# Patient Record
Sex: FEMALE | Race: WHITE | NOT HISPANIC OR LATINO | Employment: FULL TIME | URBAN - METROPOLITAN AREA
[De-identification: names, ages, dates, MRNs, and addresses within clinical notes are randomized per-mention and may not be internally consistent; named-entity substitution may affect disease eponyms.]

---

## 2019-01-08 ENCOUNTER — TELEPHONE (OUTPATIENT)
Dept: INTERNAL MEDICINE | Facility: CLINIC | Age: 49
End: 2019-01-08

## 2019-01-08 NOTE — TELEPHONE ENCOUNTER
Called and talked with the pt. Pt stated her insurance never covers the Trokendi, and she does not need a PA. Pt stated her pharmacy usually calls. Pt stated she pays cash for this medication. Nothing further was needed.

## 2019-01-10 VITALS — WEIGHT: 246 LBS | HEIGHT: 66 IN | BODY MASS INDEX: 39.53 KG/M2

## 2019-01-10 PROBLEM — E78.00 HYPERCHOLESTEREMIA: Status: ACTIVE | Noted: 2019-01-10

## 2019-01-10 PROBLEM — E66.9 OBESITY, CLASS II, BMI 35-39.9: Status: ACTIVE | Noted: 2019-01-10

## 2019-01-10 PROBLEM — F50.81 BINGE EATING DISORDER: Status: ACTIVE | Noted: 2019-01-10

## 2019-01-10 PROBLEM — F41.9 ANXIETY: Status: ACTIVE | Noted: 2019-01-10

## 2019-01-10 RX ORDER — LIRAGLUTIDE 6 MG/ML
INJECTION SUBCUTANEOUS
Refills: 2 | COMMUNITY
Start: 2018-11-18 | End: 2019-01-17 | Stop reason: SDUPTHER

## 2019-01-10 RX ORDER — PHENTERMINE HYDROCHLORIDE 37.5 MG/1
TABLET ORAL DAILY
Refills: 0 | COMMUNITY
Start: 2018-12-19 | End: 2019-01-17 | Stop reason: SDUPTHER

## 2019-01-10 RX ORDER — PHENDIMETRAZINE TARTRATE 35 MG/1
TABLET ORAL DAILY
Refills: 0 | COMMUNITY
Start: 2018-12-19 | End: 2019-01-17 | Stop reason: SDUPTHER

## 2019-01-10 RX ORDER — LURASIDONE HYDROCHLORIDE 60 MG/1
60 TABLET, FILM COATED ORAL DAILY
Refills: 0 | COMMUNITY
Start: 2018-12-16 | End: 2020-10-04

## 2019-01-10 RX ORDER — TOPIRAMATE 50 MG/1
1 CAPSULE, EXTENDED RELEASE ORAL DAILY
COMMUNITY
End: 2020-10-04

## 2019-01-17 ENCOUNTER — OFFICE VISIT (OUTPATIENT)
Dept: BARIATRICS/WEIGHT MGMT | Facility: CLINIC | Age: 49
End: 2019-01-17

## 2019-01-17 VITALS
WEIGHT: 243 LBS | BODY MASS INDEX: 39.05 KG/M2 | SYSTOLIC BLOOD PRESSURE: 132 MMHG | DIASTOLIC BLOOD PRESSURE: 80 MMHG | HEART RATE: 64 BPM | HEIGHT: 66 IN

## 2019-01-17 DIAGNOSIS — E66.9 OBESITY, CLASS II, BMI 35-39.9: Primary | ICD-10-CM

## 2019-01-17 DIAGNOSIS — E78.5 HYPERLIPIDEMIA, UNSPECIFIED HYPERLIPIDEMIA TYPE: ICD-10-CM

## 2019-01-17 PROCEDURE — MEDWTESTPT: Performed by: NURSE PRACTITIONER

## 2019-01-17 RX ORDER — LAMOTRIGINE 100 MG/1
TABLET ORAL
Refills: 0 | Status: ON HOLD | COMMUNITY
Start: 2019-01-08 | End: 2022-11-30

## 2019-01-17 RX ORDER — VENLAFAXINE HYDROCHLORIDE 150 MG/1
CAPSULE, EXTENDED RELEASE ORAL
Refills: 3 | Status: ON HOLD | COMMUNITY
Start: 2018-12-30 | End: 2022-11-30

## 2019-01-17 RX ORDER — LAMOTRIGINE 150 MG/1
100 TABLET ORAL 2 TIMES DAILY
Refills: 0 | COMMUNITY
Start: 2019-01-08 | End: 2023-01-19

## 2019-01-17 RX ORDER — PHENDIMETRAZINE TARTRATE 35 MG/1
TABLET ORAL
Qty: 28 EACH | Refills: 0 | Status: ON HOLD | OUTPATIENT
Start: 2019-01-17 | End: 2022-11-30

## 2019-01-17 RX ORDER — LIRAGLUTIDE 6 MG/ML
1.8 INJECTION SUBCUTANEOUS DAILY
Qty: 3 PEN | Refills: 2 | OUTPATIENT
Start: 2019-01-17 | End: 2020-10-04

## 2019-01-17 RX ORDER — PHENTERMINE HYDROCHLORIDE 37.5 MG/1
TABLET ORAL
Qty: 28 TABLET | Refills: 0 | OUTPATIENT
Start: 2019-01-17 | End: 2020-10-04

## 2019-01-17 RX ORDER — TRAZODONE HYDROCHLORIDE 50 MG/1
TABLET ORAL
Refills: 0 | COMMUNITY
Start: 2018-11-18 | End: 2020-10-04

## 2019-01-17 NOTE — PROGRESS NOTES
"[facility]   [facility] [address]  [place] [address]    Name: Erika Alfonso  : 1970  Patient Care Team:  Sammy Crowley DO as PCP - General (Family Medicine)    Chief Complaint;:   Chief Complaint   Patient presents with   • Follow-up        HPI     Erika Alfonso is a 48 y.o. female here to follow up in active weight loss. Patient is satisfied with weight loss progress and has no questions or concerns today.. There were no unexpected side effects.. The patient is taking the recommended multivitamin and fish oil. Hunger control has improved. The patient is exercising. The patient is not using a food journal. The BMI is Body mass index is 39.22 kg/m²..     The patient is exercising with a FITT score of:    Frequency Intensity Time Strength Training   []   0 []   0 []   0 []   0   []   1 []   1 []   1 []   1   []   2 []   2 []   2 []   2   []   3  []   4 []   3  []   4 []   3  []   4 []   3  []   4     VS and Measurements  Vitals:    19 0924   BP: 132/80   BP Location: Left arm   Patient Position: Sitting   Cuff Size: Adult   Pulse: 64   Weight: 110 kg (243 lb)   Height: 167.6 cm (66\")     Start Weight/BMI: 274lbs  Last Weight/BMI: 246lbs  Current Weight/BMI: 243  Neck: 13  Chest: 44  Waist: 40  Hips: 51  Thighs: 47    Treatment Objectives   Weight Loss Goal: Maintaining 10% loss of beginning body weight   Non-Weight Loss Goals: Improved cholesterol: has been addressed.    Past Medical History:   Diagnosis Date   • Depression    • PCOS (polycystic ovarian syndrome)        Patient Active Problem List   Diagnosis   • Hypercholesteremia   • Binge eating disorder   • Obesity, Class II, BMI 35-39.9   • Anxiety       No Known Allergies      Current Outpatient Medications:   •  Chromium (CHROMEMATE PO), Take 1 tablet by mouth 2 (Two) Times a Day. Take one white capsule with Breakfast, and one white capsule with dinner., Disp: , Rfl:   •  lamoTRIgine " (LaMICtal) 100 MG tablet, , Disp: , Rfl: 0  •  lamoTRIgine (LaMICtal) 150 MG tablet, , Disp: , Rfl: 0  •  LATUDA 60 MG tablet tablet, Take 60 mg by mouth Daily., Disp: , Rfl: 0  •  Phendimetrazine Tartrate 35 MG tablet, Take 1 tablet at 3 pm., Disp: 28 each, Rfl: 0  •  phentermine (ADIPEX-P) 37.5 MG tablet, Take one tablet at 11 am., Disp: 28 tablet, Rfl: 0  •  Topiramate ER (TROKENDI XR) 50 MG capsule sustained-release 24 hr, Take 1 capsule by mouth Daily., Disp: , Rfl:   •  traZODone (DESYREL) 50 MG tablet, TK 1 T PO HS, Disp: , Rfl: 0  •  venlafaxine XR (EFFEXOR-XR) 150 MG 24 hr capsule, TK 1 C PO D, Disp: , Rfl: 3  •  VICTOZA 18 MG/3ML solution pen-injector injection, Inject 1.8 mg under the skin into the appropriate area as directed Daily., Disp: 3 pen, Rfl: 2  •  Liraglutide (VICTOZA) 18 MG/3ML solution pen-injector injection, Inject 1.2 mg under the skin into the appropriate area as directed Daily., Disp: 3 pen, Rfl: 2    Physical Exam:    General:  .well developed; well nourished  no acute distress  obese    Lungs:  breathing is unlabored  clear to auscultation bilaterally   Heart:  regular rate and rhythm, S1, S2 normal, no murmur, click, rub or gallop       ASSESSMENT/PLAN:   Erika was seen today for follow-up.    Diagnoses and all orders for this visit:    Obesity, Class II, BMI 35-39.9  -     Phendimetrazine Tartrate 35 MG tablet; Take 1 tablet at 3 pm.  -     phentermine (ADIPEX-P) 37.5 MG tablet; Take one tablet at 11 am.  -     VICTOZA 18 MG/3ML solution pen-injector injection; Inject 1.8 mg under the skin into the appropriate area as directed Daily.  -     Liraglutide (VICTOZA) 18 MG/3ML solution pen-injector injection; Inject 1.2 mg under the skin into the appropriate area as directed Daily.   -Increase liraglutide to recommended 3.0mg.    -Hunger control better with 3735.    Hyperlipidemia, unspecified hyperlipidemia type    I have instructed the patient to continue with pursuit of medical weight  loss as a part of this program. The current plan for this month includes: continue current exercise efforts, it is appropriate to continue anorectic medications as prescribed at this time, weight loss goal 4-6lbs this month. and adjust macronutrients as discussed. Bring food journal to next visit for review. Add protein this month, is going to try protein shakes. Goal 100g/day of protein this month. Discussed ketogenic VS adequate protein diet. Keep up excellent food choices. Is tapering off of lamictal with psychiatrist and doing well.     Plan of care reviewed with the patient at the conclusion of today's visit. We discussed the risks, benefits, and limitations of treatments. Continue medications and OTC supplements as discussed. Patient verbalizes understanding of and agreement with management plan.      Return in about 1 month (around 2/17/2019).     CYNTHIA Mcwilliams

## 2019-10-30 ENCOUNTER — TREATMENT (OUTPATIENT)
Dept: PHYSICAL THERAPY | Facility: CLINIC | Age: 49
End: 2019-10-30

## 2019-10-30 DIAGNOSIS — M76.822 TIBIALIS POSTERIOR TENDONITIS, LEFT: ICD-10-CM

## 2019-10-30 DIAGNOSIS — M72.2 PLANTAR FASCIITIS: Primary | ICD-10-CM

## 2019-10-30 PROCEDURE — 97034 APP MDLTY 1+CNTRST BTH EA 15: CPT | Performed by: PHYSICAL THERAPIST

## 2019-10-30 PROCEDURE — 97110 THERAPEUTIC EXERCISES: CPT | Performed by: PHYSICAL THERAPIST

## 2019-10-30 PROCEDURE — 97140 MANUAL THERAPY 1/> REGIONS: CPT | Performed by: PHYSICAL THERAPIST

## 2019-10-30 PROCEDURE — 97161 PT EVAL LOW COMPLEX 20 MIN: CPT | Performed by: PHYSICAL THERAPIST

## 2019-10-30 NOTE — PROGRESS NOTES
Physical Therapy Initial Evaluation and Plan of Care      Patient: Erika Alfonso   : 1970  Diagnosis/ICD-10 Code:  No primary diagnosis found.  Referring practitioner: Self Referring    Subjective Evaluation    History of Present Illness  Mechanism of injury: 10-15 years ago plantar fascitiis R foot.  She had injections and it resolved quickly.    2019 insidious onset of pain in the L foot and immediately had injections x4 in the same foot.  8-10 weeks.  R foot started hurting when she was in a boot for the L foot.  The L foot was in a boot for 4 weeks.     R foot is better.      L foot is still really bad.     She is wearing orthotic in her shoes at this time.     Never has PT before.     Pain  Current pain ratin  Location: L heel and medial ankle.   Quality: burning, sharp and throbbing (nail in the foot)  Relieving factors: ice (rolling on water bottle frozen)  Aggravating factors: standing, ambulation and repetitive movement  Progression: worsening             Objective       Observations   Left Ankle/Foot   Positive for edema.     Palpation   Left   Hypertonic in the posterior tibialis.   Tenderness of the posterior tibialis.     Tenderness   Left Ankle/Foot   Tenderness in the Achilles insertion, medial calcaneus, medial malleolus, plantar fascia and posterior tibial tendon.     Additional Tenderness Details  Post tibialis tendon and Medial calcaneal tubercle are the primary areas of pain noted today.     Active Range of Motion   Left Ankle/Foot   Dorsiflexion (ke): 3 degrees with pain  Inversion: with pain  Eversion: with pain    Right Ankle/Foot   Dorsiflexion (ke): 6 degrees     Strength/Myotome Testing     Left Ankle/Foot   Inversion: 3+ (pain at medial ankle)  Eversion: 3+ (pain at medial ankle. )    Additional Strength Details  DId not test full wb DF or PF to avoid elevating pain.          Assessment & Plan     Assessment  Impairments: abnormal gait, abnormal or restricted ROM,  activity intolerance, impaired balance, impaired physical strength, lacks appropriate home exercise program, pain with function and weight-bearing intolerance  Assessment details: Patient is a 49 year old female who comes to physical therapy with L foot and ankle pain. Signs and symptoms are consistent with plantar fascitis and PTT on the L LE.  The patient currently has pain, decreased ROM, decreased strength, and inability to perform all essential functional activities. Pt will benefit from skilled PT services to address the above issues.     Prognosis: fair  Prognosis details:     SHORT TERM GOALS:     4 weeks  1. Pt independent with HEP   2. Pt to demonstrate ability to ambulate in the clinic without walking boot and with no reports of increased pain  3. Pt to demonstrate ability to ambulate in the clinic without antalgic gait   3. Pt to demonstrate ability to perform single leg heel raise on the left without increase in pain      LONG TERM GOALS:   6 weeks  1. Pt to demonstrate ability to perform full functional squat with good form and no increase in pain in the left foot/ankle  2. Pt to demonstrate ability to ambulate on TM for 10 minutes with normal gait pattern without increase in pain  3. Pt to report being able to work full shift without increase in pain in the left ankle/foot  4. Pt to demonstrate ability to perform SLS on the left lower extremity for 30 seconds without LOB or increased pain     Functional Limitations: walking, uncomfortable because of pain, standing and unable to perform repetitive tasks  Plan  Therapy options: will be seen for skilled physical therapy services  Planned modality interventions: cryotherapy, contrast bath immersion, thermotherapy (hydrocollator packs), iontophoresis and ultrasound  Planned therapy interventions: gait training, functional ROM exercises, flexibility, home exercise program, IADL retraining, joint mobilization, therapeutic activities, stretching, soft tissue  mobilization, motor coordination training, manual therapy, strengthening, balance/weight-bearing training and ADL retraining  Duration in weeks: 6  Treatment plan discussed with: patient  Plan details: Pt will be seen 2-3 x/week.         Manual Therapy:    12     mins  07828;  Therapeutic Exercise:    11     mins  18560;     Neuromuscular Fidel:        mins  05769;    Therapeutic Activity:          mins  84425;     Gait Training:           mins  28691;     Ultrasound:          mins  15039;    Electrical Stimulation:         mins  74272 ( );  Dry Needling          mins self-pay  Contrast bath  14    Timed Treatment:   37   mins   Total Treatment:     61   mins    PT SIGNATURE: Vinny Vaughn, PT   DATE TREATMENT INITIATED: 10/30/2019    Initial Certification  Certification Period: 1/28/2020  I certify that the therapy services are furnished while this patient is under my care.  The services outlined above are required by this patient, and will be reviewed every 90 days.     PHYSICIAN: Referring, Self      DATE:     Please sign and return via fax to  .. Thank you, [facility].

## 2019-11-04 ENCOUNTER — TREATMENT (OUTPATIENT)
Dept: PHYSICAL THERAPY | Facility: CLINIC | Age: 49
End: 2019-11-04

## 2019-11-04 DIAGNOSIS — M76.822 TIBIALIS POSTERIOR TENDONITIS, LEFT: ICD-10-CM

## 2019-11-04 DIAGNOSIS — M72.2 PLANTAR FASCIITIS: Primary | ICD-10-CM

## 2019-11-04 PROCEDURE — 97110 THERAPEUTIC EXERCISES: CPT | Performed by: PHYSICAL THERAPIST

## 2019-11-04 PROCEDURE — 97530 THERAPEUTIC ACTIVITIES: CPT | Performed by: PHYSICAL THERAPIST

## 2019-11-04 PROCEDURE — 97140 MANUAL THERAPY 1/> REGIONS: CPT | Performed by: PHYSICAL THERAPIST

## 2019-11-04 NOTE — PROGRESS NOTES
Physical Therapy Daily Progress Note    Patient Information  Erika Alfonso  1970      Visit # : 2    Erika Alfonso reports 2/10 pain today at rest in the ankle and 5/10 in the foot.  Friday went to the MD and had a new brace given to her and 2 injections in the anterior ankle.     Post PT relief then elevated soreness that night.     Pt reports the contrast bath is helping her sleep.     Objective Pt presents to PT today with ankle brace present.     Pt with good reproduction of HEP today at PT.     Palpation:  Posterior tibialis tendon at the posterior medial malleolus and the medial calcaneal tubercle are the most painful areas to palpation.       See Exercise, Manual, and Modality Logs for complete treatment.     Assessment/Plan  Pt with improved status.  She did respond well to HEP and STM.      Progress strengthening /stabilization /functional activity  Check response to STM with tool.     Visit Diagnoses:    ICD-10-CM ICD-9-CM   1. Plantar fasciitis M72.2 728.71   2. Tibialis posterior tendonitis, left M76.822 726.72            Manual Therapy:    17     mins  92587;  Therapeutic Exercise:     12    mins  78024;     Neuromuscular Fidel:        mins  67313;    Therapeutic Activity:     11     mins  67035;     Gait Training:        ___  mins  38355;     Ultrasound:          mins  90799;    Electrical Stimulation:         mins  08239 ( );  Dry Needling          mins self-pay    Timed Treatment:  40    mins   Total Treatment:     40   mins    Vinny Vaughn, PT  Physical Therapist

## 2020-10-04 ENCOUNTER — TELEPHONE (OUTPATIENT)
Dept: URGENT CARE | Facility: CLINIC | Age: 50
End: 2020-10-04

## 2020-10-04 NOTE — TELEPHONE ENCOUNTER
Patient returned call, I notified her that there is a fracture shown. She states she would like to go to [facility] - she has been before, does not need a referral.

## 2020-10-05 ENCOUNTER — TELEPHONE (OUTPATIENT)
Dept: URGENT CARE | Facility: CLINIC | Age: 50
End: 2020-10-05

## 2020-10-05 NOTE — TELEPHONE ENCOUNTER
Pt notified of ortho referral and appointment to [facility], to see Dr. Michelle 10/6/20 @ 0900. Pt notified, no questions at this time

## 2021-03-12 ENCOUNTER — TRANSCRIBE ORDERS (OUTPATIENT)
Dept: ADMINISTRATIVE | Facility: HOSPITAL | Age: 51
End: 2021-03-12

## 2021-03-12 DIAGNOSIS — Z13.828 SCREENING FOR RHEUMATOID ARTHRITIS: Primary | ICD-10-CM

## 2021-03-22 ENCOUNTER — APPOINTMENT (OUTPATIENT)
Dept: BONE DENSITY | Facility: HOSPITAL | Age: 51
End: 2021-03-22

## 2021-04-28 ENCOUNTER — APPOINTMENT (OUTPATIENT)
Dept: BONE DENSITY | Facility: HOSPITAL | Age: 51
End: 2021-04-28

## 2021-05-10 ENCOUNTER — APPOINTMENT (OUTPATIENT)
Dept: BONE DENSITY | Facility: HOSPITAL | Age: 51
End: 2021-05-10

## 2021-10-03 ENCOUNTER — ANESTHESIA EVENT (OUTPATIENT)
Dept: PERIOP | Facility: HOSPITAL | Age: 51
End: 2021-10-03

## 2021-10-03 ENCOUNTER — HOSPITAL ENCOUNTER (OUTPATIENT)
Facility: HOSPITAL | Age: 51
Discharge: HOME | End: 2021-10-03
Attending: STUDENT IN AN ORGANIZED HEALTH CARE EDUCATION/TRAINING PROGRAM | Admitting: SURGERY

## 2021-10-03 ENCOUNTER — APPOINTMENT (OUTPATIENT)
Dept: CT IMAGING | Facility: HOSPITAL | Age: 51
End: 2021-10-03

## 2021-10-03 ENCOUNTER — ANESTHESIA (OUTPATIENT)
Dept: PERIOP | Facility: HOSPITAL | Age: 51
End: 2021-10-03

## 2021-10-03 VITALS
HEART RATE: 90 BPM | BODY MASS INDEX: 34.55 KG/M2 | HEIGHT: 66 IN | OXYGEN SATURATION: 99 % | RESPIRATION RATE: 18 BRPM | SYSTOLIC BLOOD PRESSURE: 104 MMHG | TEMPERATURE: 98.3 F | DIASTOLIC BLOOD PRESSURE: 58 MMHG | WEIGHT: 215 LBS

## 2021-10-03 DIAGNOSIS — K35.80 ACUTE APPENDICITIS, UNSPECIFIED ACUTE APPENDICITIS TYPE: Primary | ICD-10-CM

## 2021-10-03 LAB
ALBUMIN SERPL-MCNC: 3.97 G/DL (ref 3.5–5.2)
ALBUMIN/GLOB SERPL: 1.6 G/DL
ALP SERPL-CCNC: 128 U/L (ref 39–117)
ALT SERPL W P-5'-P-CCNC: 13 U/L (ref 1–33)
ANION GAP SERPL CALCULATED.3IONS-SCNC: 10 MMOL/L (ref 5–15)
APTT PPP: 29.2 SECONDS (ref 25.5–35.4)
AST SERPL-CCNC: 16 U/L (ref 1–32)
BACTERIA UR QL AUTO: ABNORMAL /HPF
BASOPHILS # BLD AUTO: 0.05 10*3/MM3 (ref 0–0.2)
BASOPHILS NFR BLD AUTO: 0.5 % (ref 0–1.5)
BILIRUB SERPL-MCNC: 0.4 MG/DL (ref 0–1.2)
BILIRUB UR QL STRIP: NEGATIVE
BUN SERPL-MCNC: 9 MG/DL (ref 6–20)
BUN/CREAT SERPL: 11.7 (ref 7–25)
CALCIUM SPEC-SCNC: 8.4 MG/DL (ref 8.6–10.5)
CHLORIDE SERPL-SCNC: 101 MMOL/L (ref 98–107)
CLARITY UR: CLEAR
CO2 SERPL-SCNC: 23 MMOL/L (ref 22–29)
COLOR UR: YELLOW
CREAT SERPL-MCNC: 0.77 MG/DL (ref 0.57–1)
CRP SERPL-MCNC: 0.99 MG/DL (ref 0–0.5)
DEPRECATED RDW RBC AUTO: 39.8 FL (ref 37–54)
EOSINOPHIL # BLD AUTO: 0.13 10*3/MM3 (ref 0–0.4)
EOSINOPHIL NFR BLD AUTO: 1.4 % (ref 0.3–6.2)
ERYTHROCYTE [DISTWIDTH] IN BLOOD BY AUTOMATED COUNT: 13 % (ref 12.3–15.4)
FLUAV SUBTYP SPEC NAA+PROBE: NOT DETECTED
FLUBV RNA ISLT QL NAA+PROBE: NOT DETECTED
GFR SERPL CREATININE-BSD FRML MDRD: 79 ML/MIN/1.73
GLOBULIN UR ELPH-MCNC: 2.5 GM/DL
GLUCOSE SERPL-MCNC: 114 MG/DL (ref 65–99)
GLUCOSE UR STRIP-MCNC: NEGATIVE MG/DL
HCT VFR BLD AUTO: 40.6 % (ref 34–46.6)
HGB BLD-MCNC: 13.4 G/DL (ref 12–15.9)
HGB UR QL STRIP.AUTO: NEGATIVE
HYALINE CASTS UR QL AUTO: ABNORMAL /LPF
IMM GRANULOCYTES # BLD AUTO: 0.03 10*3/MM3 (ref 0–0.05)
IMM GRANULOCYTES NFR BLD AUTO: 0.3 % (ref 0–0.5)
INR PPP: 0.91 (ref 0.9–1.1)
KETONES UR QL STRIP: NEGATIVE
LEUKOCYTE ESTERASE UR QL STRIP.AUTO: ABNORMAL
LIPASE SERPL-CCNC: 29 U/L (ref 13–60)
LYMPHOCYTES # BLD AUTO: 1.05 10*3/MM3 (ref 0.7–3.1)
LYMPHOCYTES NFR BLD AUTO: 11.4 % (ref 19.6–45.3)
MAGNESIUM SERPL-MCNC: 1.6 MG/DL (ref 1.6–2.6)
MCH RBC QN AUTO: 27.9 PG (ref 26.6–33)
MCHC RBC AUTO-ENTMCNC: 33 G/DL (ref 31.5–35.7)
MCV RBC AUTO: 84.6 FL (ref 79–97)
MONOCYTES # BLD AUTO: 0.52 10*3/MM3 (ref 0.1–0.9)
MONOCYTES NFR BLD AUTO: 5.6 % (ref 5–12)
NEUTROPHILS NFR BLD AUTO: 7.44 10*3/MM3 (ref 1.7–7)
NEUTROPHILS NFR BLD AUTO: 80.8 % (ref 42.7–76)
NITRITE UR QL STRIP: NEGATIVE
NRBC BLD AUTO-RTO: 0 /100 WBC (ref 0–0.2)
PH UR STRIP.AUTO: 5.5 [PH] (ref 5–8)
PLATELET # BLD AUTO: 203 10*3/MM3 (ref 140–450)
PMV BLD AUTO: 9.2 FL (ref 6–12)
POTASSIUM SERPL-SCNC: 3.7 MMOL/L (ref 3.5–5.2)
PROCALCITONIN SERPL-MCNC: 0.03 NG/ML (ref 0–0.25)
PROT SERPL-MCNC: 6.5 G/DL (ref 6–8.5)
PROT UR QL STRIP: NEGATIVE
PROTHROMBIN TIME: 12.7 SECONDS (ref 12.8–14.5)
RBC # BLD AUTO: 4.8 10*6/MM3 (ref 3.77–5.28)
RBC # UR: ABNORMAL /HPF
REF LAB TEST METHOD: ABNORMAL
SARS-COV-2 RNA PNL SPEC NAA+PROBE: NOT DETECTED
SODIUM SERPL-SCNC: 134 MMOL/L (ref 136–145)
SP GR UR STRIP: 1.02 (ref 1–1.03)
SQUAMOUS #/AREA URNS HPF: ABNORMAL /HPF
UROBILINOGEN UR QL STRIP: ABNORMAL
WBC # BLD AUTO: 9.22 10*3/MM3 (ref 3.4–10.8)
WBC UR QL AUTO: ABNORMAL /HPF

## 2021-10-03 PROCEDURE — 80053 COMPREHEN METABOLIC PANEL: CPT | Performed by: STUDENT IN AN ORGANIZED HEALTH CARE EDUCATION/TRAINING PROGRAM

## 2021-10-03 PROCEDURE — 86140 C-REACTIVE PROTEIN: CPT | Performed by: STUDENT IN AN ORGANIZED HEALTH CARE EDUCATION/TRAINING PROGRAM

## 2021-10-03 PROCEDURE — 85610 PROTHROMBIN TIME: CPT | Performed by: STUDENT IN AN ORGANIZED HEALTH CARE EDUCATION/TRAINING PROGRAM

## 2021-10-03 PROCEDURE — 96375 TX/PRO/DX INJ NEW DRUG ADDON: CPT

## 2021-10-03 PROCEDURE — 25010000002 HYDROMORPHONE 1 MG/ML SOLUTION: Performed by: STUDENT IN AN ORGANIZED HEALTH CARE EDUCATION/TRAINING PROGRAM

## 2021-10-03 PROCEDURE — 99284 EMERGENCY DEPT VISIT MOD MDM: CPT

## 2021-10-03 PROCEDURE — 25010000002 HYDROMORPHONE PER 4 MG: Performed by: EMERGENCY MEDICINE

## 2021-10-03 PROCEDURE — 25010000002 PIPERACILLIN SOD-TAZOBACTAM PER 1 G: Performed by: NURSE ANESTHETIST, CERTIFIED REGISTERED

## 2021-10-03 PROCEDURE — 99284 EMERGENCY DEPT VISIT MOD MDM: CPT | Performed by: SURGERY

## 2021-10-03 PROCEDURE — 25010000002 ONDANSETRON PER 1 MG: Performed by: STUDENT IN AN ORGANIZED HEALTH CARE EDUCATION/TRAINING PROGRAM

## 2021-10-03 PROCEDURE — C9803 HOPD COVID-19 SPEC COLLECT: HCPCS

## 2021-10-03 PROCEDURE — 85025 COMPLETE CBC W/AUTO DIFF WBC: CPT | Performed by: STUDENT IN AN ORGANIZED HEALTH CARE EDUCATION/TRAINING PROGRAM

## 2021-10-03 PROCEDURE — 84145 PROCALCITONIN (PCT): CPT | Performed by: STUDENT IN AN ORGANIZED HEALTH CARE EDUCATION/TRAINING PROGRAM

## 2021-10-03 PROCEDURE — 96376 TX/PRO/DX INJ SAME DRUG ADON: CPT

## 2021-10-03 PROCEDURE — 81001 URINALYSIS AUTO W/SCOPE: CPT | Performed by: STUDENT IN AN ORGANIZED HEALTH CARE EDUCATION/TRAINING PROGRAM

## 2021-10-03 PROCEDURE — 83690 ASSAY OF LIPASE: CPT | Performed by: STUDENT IN AN ORGANIZED HEALTH CARE EDUCATION/TRAINING PROGRAM

## 2021-10-03 PROCEDURE — C1889 IMPLANT/INSERT DEVICE, NOC: HCPCS | Performed by: SURGERY

## 2021-10-03 PROCEDURE — 74177 CT ABD & PELVIS W/CONTRAST: CPT

## 2021-10-03 PROCEDURE — 25010000002 ONDANSETRON PER 1 MG: Performed by: NURSE ANESTHETIST, CERTIFIED REGISTERED

## 2021-10-03 PROCEDURE — 25010000002 PIPERACILLIN SOD-TAZOBACTAM PER 1 G: Performed by: STUDENT IN AN ORGANIZED HEALTH CARE EDUCATION/TRAINING PROGRAM

## 2021-10-03 PROCEDURE — 83735 ASSAY OF MAGNESIUM: CPT | Performed by: STUDENT IN AN ORGANIZED HEALTH CARE EDUCATION/TRAINING PROGRAM

## 2021-10-03 PROCEDURE — 25010000002 MIDAZOLAM PER 1 MG: Performed by: NURSE ANESTHETIST, CERTIFIED REGISTERED

## 2021-10-03 PROCEDURE — 25010000002 PROPOFOL 10 MG/ML EMULSION: Performed by: NURSE ANESTHETIST, CERTIFIED REGISTERED

## 2021-10-03 PROCEDURE — 85730 THROMBOPLASTIN TIME PARTIAL: CPT | Performed by: STUDENT IN AN ORGANIZED HEALTH CARE EDUCATION/TRAINING PROGRAM

## 2021-10-03 PROCEDURE — 87636 SARSCOV2 & INF A&B AMP PRB: CPT | Performed by: STUDENT IN AN ORGANIZED HEALTH CARE EDUCATION/TRAINING PROGRAM

## 2021-10-03 PROCEDURE — 44970 LAPAROSCOPY APPENDECTOMY: CPT | Performed by: SURGERY

## 2021-10-03 PROCEDURE — 25010000002 FENTANYL CITRATE (PF) 50 MCG/ML SOLUTION: Performed by: NURSE ANESTHETIST, CERTIFIED REGISTERED

## 2021-10-03 PROCEDURE — 25010000002 NEOSTIGMINE 10 MG/10ML SOLUTION: Performed by: NURSE ANESTHETIST, CERTIFIED REGISTERED

## 2021-10-03 PROCEDURE — 96365 THER/PROPH/DIAG IV INF INIT: CPT

## 2021-10-03 PROCEDURE — 25010000002 IOPAMIDOL 61 % SOLUTION: Performed by: STUDENT IN AN ORGANIZED HEALTH CARE EDUCATION/TRAINING PROGRAM

## 2021-10-03 DEVICE — ARISTA AH ABSORBABLE HEMOSTATIC PARTICLES
Type: IMPLANTABLE DEVICE | Site: ABDOMEN | Status: FUNCTIONAL
Brand: ARISTA™ AH

## 2021-10-03 DEVICE — THE ECHELON, ECHELON ENDOPATH™ AND ECHELON FLEX™ FAMILIES OF ENDOSCOPIC LINEAR CUTTERS AND RELOADS ARE STERILE, SINGLE PATIENT USE INSTRUMENTS THAT SIMULTANEOUSLY CUT AND STAPLE TISSUE. THERE ARE SIX STAGGERED ROWS OF STAPLES, THREE ON EITHER SIDE OF THE CUT LINE. THE 45 MM INSTRUMENTS HAVE A STAPLE LINE THATIS APPROXIMATELY 45 MM LONG AND A CUT LINE THAT IS APPROXIMATELY 42 MM LONG. THE SHAFT CAN ROTATE FREELY IN BOTH DIRECTIONS AND AN ARTICULATION MECHANISM ON ARTICULATING INSTRUMENTS ENABLES BENDING THE DISTAL PORTIONOF THE SHAFT TO FACILITATE LATERAL ACCESS OF THE OPERATIVE SITE.THE INSTRUMENTS ARE SHIPPED WITHOUT A RELOAD AND MUST BE LOADED PRIOR TO USE. A STAPLE RETAINING CAP ON THE RELOAD PROTECTS THE STAPLE LEG POINTS DURING SHIPPING AND TRANSPORTATION. THE INSTRUMENTS’ LOCK-OUT FEATURE IS DESIGNED TO PREVENT A USED RELOAD FROM BEING REFIRED.
Type: IMPLANTABLE DEVICE | Site: ABDOMEN | Status: FUNCTIONAL
Brand: ECHELON ENDOPATH

## 2021-10-03 DEVICE — ENDOSCOPIC LINEAR CUTTER RELOADS GRAY 2.0 MM
Type: IMPLANTABLE DEVICE | Site: ABDOMEN | Status: FUNCTIONAL
Brand: ECHELON; ENDOPATH

## 2021-10-03 RX ORDER — SODIUM CHLORIDE, SODIUM LACTATE, POTASSIUM CHLORIDE, CALCIUM CHLORIDE 600; 310; 30; 20 MG/100ML; MG/100ML; MG/100ML; MG/100ML
INJECTION, SOLUTION INTRAVENOUS CONTINUOUS PRN
Status: DISCONTINUED | OUTPATIENT
Start: 2021-10-03 | End: 2021-10-03 | Stop reason: SURG

## 2021-10-03 RX ORDER — SODIUM CHLORIDE 0.9 % (FLUSH) 0.9 %
10 SYRINGE (ML) INJECTION EVERY 12 HOURS SCHEDULED
Status: DISCONTINUED | OUTPATIENT
Start: 2021-10-03 | End: 2021-10-03 | Stop reason: HOSPADM

## 2021-10-03 RX ORDER — ROCURONIUM BROMIDE 10 MG/ML
INJECTION, SOLUTION INTRAVENOUS AS NEEDED
Status: DISCONTINUED | OUTPATIENT
Start: 2021-10-03 | End: 2021-10-03 | Stop reason: SURG

## 2021-10-03 RX ORDER — TRAMADOL HYDROCHLORIDE 50 MG/1
50 TABLET ORAL EVERY 6 HOURS PRN
Qty: 15 TABLET | Refills: 0 | Status: SHIPPED | OUTPATIENT
Start: 2021-10-03 | End: 2022-10-03

## 2021-10-03 RX ORDER — IBUPROFEN 600 MG/1
600 TABLET ORAL EVERY 4 HOURS PRN
Qty: 20 TABLET | Refills: 0 | Status: SHIPPED | OUTPATIENT
Start: 2021-10-03 | End: 2023-01-19

## 2021-10-03 RX ORDER — BUPIVACAINE HYDROCHLORIDE AND EPINEPHRINE 5; 5 MG/ML; UG/ML
INJECTION, SOLUTION EPIDURAL; INTRACAUDAL; PERINEURAL AS NEEDED
Status: DISCONTINUED | OUTPATIENT
Start: 2021-10-03 | End: 2021-10-03 | Stop reason: HOSPADM

## 2021-10-03 RX ORDER — ONDANSETRON 4 MG/1
4 TABLET, ORALLY DISINTEGRATING ORAL ONCE
Status: DISCONTINUED | OUTPATIENT
Start: 2021-10-03 | End: 2021-10-03

## 2021-10-03 RX ORDER — PROPOFOL 10 MG/ML
VIAL (ML) INTRAVENOUS AS NEEDED
Status: DISCONTINUED | OUTPATIENT
Start: 2021-10-03 | End: 2021-10-03 | Stop reason: SURG

## 2021-10-03 RX ORDER — NEOSTIGMINE METHYLSULFATE 1 MG/ML
INJECTION, SOLUTION INTRAVENOUS AS NEEDED
Status: DISCONTINUED | OUTPATIENT
Start: 2021-10-03 | End: 2021-10-03 | Stop reason: SURG

## 2021-10-03 RX ORDER — GLYCOPYRROLATE 0.2 MG/ML
INJECTION INTRAMUSCULAR; INTRAVENOUS AS NEEDED
Status: DISCONTINUED | OUTPATIENT
Start: 2021-10-03 | End: 2021-10-03 | Stop reason: SURG

## 2021-10-03 RX ORDER — OXYCODONE HYDROCHLORIDE AND ACETAMINOPHEN 5; 325 MG/1; MG/1
1 TABLET ORAL ONCE AS NEEDED
Status: DISCONTINUED | OUTPATIENT
Start: 2021-10-03 | End: 2021-10-03 | Stop reason: HOSPADM

## 2021-10-03 RX ORDER — SODIUM CHLORIDE 0.9 % (FLUSH) 0.9 %
10 SYRINGE (ML) INJECTION AS NEEDED
Status: DISCONTINUED | OUTPATIENT
Start: 2021-10-03 | End: 2021-10-03 | Stop reason: HOSPADM

## 2021-10-03 RX ORDER — TRAMADOL HYDROCHLORIDE 50 MG/1
50 TABLET ORAL EVERY 6 HOURS PRN
Qty: 12 TABLET | Refills: 0 | Status: SHIPPED | OUTPATIENT
Start: 2021-10-03 | End: 2021-10-03

## 2021-10-03 RX ORDER — KETOROLAC TROMETHAMINE 30 MG/ML
30 INJECTION, SOLUTION INTRAMUSCULAR; INTRAVENOUS EVERY 6 HOURS PRN
Status: DISCONTINUED | OUTPATIENT
Start: 2021-10-03 | End: 2021-10-03 | Stop reason: HOSPADM

## 2021-10-03 RX ORDER — ACETAMINOPHEN 500 MG
1000 TABLET ORAL EVERY 6 HOURS
Status: DISCONTINUED | OUTPATIENT
Start: 2021-10-03 | End: 2021-10-03 | Stop reason: HOSPADM

## 2021-10-03 RX ORDER — ONDANSETRON 2 MG/ML
4 INJECTION INTRAMUSCULAR; INTRAVENOUS ONCE
Status: COMPLETED | OUTPATIENT
Start: 2021-10-03 | End: 2021-10-03

## 2021-10-03 RX ORDER — SODIUM CHLORIDE 9 MG/ML
INJECTION, SOLUTION INTRAVENOUS AS NEEDED
Status: DISCONTINUED | OUTPATIENT
Start: 2021-10-03 | End: 2021-10-03 | Stop reason: HOSPADM

## 2021-10-03 RX ORDER — HYDROMORPHONE HYDROCHLORIDE 1 MG/ML
0.5 INJECTION, SOLUTION INTRAMUSCULAR; INTRAVENOUS; SUBCUTANEOUS ONCE
Status: COMPLETED | OUTPATIENT
Start: 2021-10-03 | End: 2021-10-03

## 2021-10-03 RX ORDER — PIPERACILLIN SODIUM, TAZOBACTAM SODIUM 3; .375 G/15ML; G/15ML
INJECTION, POWDER, LYOPHILIZED, FOR SOLUTION INTRAVENOUS AS NEEDED
Status: DISCONTINUED | OUTPATIENT
Start: 2021-10-03 | End: 2021-10-03 | Stop reason: SURG

## 2021-10-03 RX ORDER — DROPERIDOL 2.5 MG/ML
0.62 INJECTION, SOLUTION INTRAMUSCULAR; INTRAVENOUS ONCE AS NEEDED
Status: DISCONTINUED | OUTPATIENT
Start: 2021-10-03 | End: 2021-10-03 | Stop reason: HOSPADM

## 2021-10-03 RX ORDER — TRAMADOL HYDROCHLORIDE 50 MG/1
50 TABLET ORAL EVERY 6 HOURS PRN
Status: DISCONTINUED | OUTPATIENT
Start: 2021-10-03 | End: 2021-10-03 | Stop reason: HOSPADM

## 2021-10-03 RX ORDER — FAMOTIDINE 10 MG/ML
INJECTION, SOLUTION INTRAVENOUS AS NEEDED
Status: DISCONTINUED | OUTPATIENT
Start: 2021-10-03 | End: 2021-10-03 | Stop reason: SURG

## 2021-10-03 RX ORDER — ONDANSETRON 2 MG/ML
INJECTION INTRAMUSCULAR; INTRAVENOUS AS NEEDED
Status: DISCONTINUED | OUTPATIENT
Start: 2021-10-03 | End: 2021-10-03 | Stop reason: SURG

## 2021-10-03 RX ORDER — MIDAZOLAM HYDROCHLORIDE 1 MG/ML
1 INJECTION INTRAMUSCULAR; INTRAVENOUS
Status: DISCONTINUED | OUTPATIENT
Start: 2021-10-03 | End: 2021-10-03 | Stop reason: HOSPADM

## 2021-10-03 RX ORDER — FENTANYL CITRATE 50 UG/ML
50 INJECTION, SOLUTION INTRAMUSCULAR; INTRAVENOUS
Status: DISCONTINUED | OUTPATIENT
Start: 2021-10-03 | End: 2021-10-03 | Stop reason: HOSPADM

## 2021-10-03 RX ORDER — ACETAMINOPHEN 500 MG
1000 TABLET ORAL EVERY 6 HOURS
Qty: 40 TABLET | Refills: 0 | Status: SHIPPED | OUTPATIENT
Start: 2021-10-03 | End: 2023-01-19

## 2021-10-03 RX ORDER — FENTANYL CITRATE 50 UG/ML
INJECTION, SOLUTION INTRAMUSCULAR; INTRAVENOUS AS NEEDED
Status: DISCONTINUED | OUTPATIENT
Start: 2021-10-03 | End: 2021-10-03 | Stop reason: SURG

## 2021-10-03 RX ORDER — LIDOCAINE HYDROCHLORIDE 20 MG/ML
INJECTION, SOLUTION INFILTRATION; PERINEURAL AS NEEDED
Status: DISCONTINUED | OUTPATIENT
Start: 2021-10-03 | End: 2021-10-03 | Stop reason: SURG

## 2021-10-03 RX ORDER — ONDANSETRON 2 MG/ML
4 INJECTION INTRAMUSCULAR; INTRAVENOUS AS NEEDED
Status: DISCONTINUED | OUTPATIENT
Start: 2021-10-03 | End: 2021-10-03 | Stop reason: HOSPADM

## 2021-10-03 RX ORDER — MIDAZOLAM HYDROCHLORIDE 1 MG/ML
INJECTION INTRAMUSCULAR; INTRAVENOUS AS NEEDED
Status: DISCONTINUED | OUTPATIENT
Start: 2021-10-03 | End: 2021-10-03 | Stop reason: SURG

## 2021-10-03 RX ORDER — SODIUM CHLORIDE, SODIUM LACTATE, POTASSIUM CHLORIDE, CALCIUM CHLORIDE 600; 310; 30; 20 MG/100ML; MG/100ML; MG/100ML; MG/100ML
100 INJECTION, SOLUTION INTRAVENOUS ONCE AS NEEDED
Status: DISCONTINUED | OUTPATIENT
Start: 2021-10-03 | End: 2021-10-03 | Stop reason: HOSPADM

## 2021-10-03 RX ORDER — IBUPROFEN 600 MG/1
600 TABLET ORAL EVERY 4 HOURS PRN
Status: DISCONTINUED | OUTPATIENT
Start: 2021-10-03 | End: 2021-10-03 | Stop reason: HOSPADM

## 2021-10-03 RX ORDER — IPRATROPIUM BROMIDE AND ALBUTEROL SULFATE 2.5; .5 MG/3ML; MG/3ML
3 SOLUTION RESPIRATORY (INHALATION) ONCE AS NEEDED
Status: DISCONTINUED | OUTPATIENT
Start: 2021-10-03 | End: 2021-10-03 | Stop reason: HOSPADM

## 2021-10-03 RX ORDER — SODIUM CHLORIDE, SODIUM LACTATE, POTASSIUM CHLORIDE, CALCIUM CHLORIDE 600; 310; 30; 20 MG/100ML; MG/100ML; MG/100ML; MG/100ML
125 INJECTION, SOLUTION INTRAVENOUS ONCE
Status: DISCONTINUED | OUTPATIENT
Start: 2021-10-03 | End: 2021-10-03 | Stop reason: HOSPADM

## 2021-10-03 RX ADMIN — FENTANYL CITRATE 100 MCG: 50 INJECTION INTRAMUSCULAR; INTRAVENOUS at 14:43

## 2021-10-03 RX ADMIN — ROCURONIUM BROMIDE 30 MG: 10 SOLUTION INTRAVENOUS at 15:08

## 2021-10-03 RX ADMIN — IOPAMIDOL 90 ML: 612 INJECTION, SOLUTION INTRAVENOUS at 07:34

## 2021-10-03 RX ADMIN — SODIUM CHLORIDE, POTASSIUM CHLORIDE, SODIUM LACTATE AND CALCIUM CHLORIDE: 600; 310; 30; 20 INJECTION, SOLUTION INTRAVENOUS at 14:43

## 2021-10-03 RX ADMIN — FAMOTIDINE 20 MG: 10 INJECTION INTRAVENOUS at 09:31

## 2021-10-03 RX ADMIN — SODIUM CHLORIDE 1000 ML: 9 INJECTION, SOLUTION INTRAVENOUS at 06:00

## 2021-10-03 RX ADMIN — FAMOTIDINE 20 MG: 10 INJECTION INTRAVENOUS at 15:08

## 2021-10-03 RX ADMIN — ACETAMINOPHEN 1000 MG: 500 TABLET ORAL at 10:51

## 2021-10-03 RX ADMIN — MIDAZOLAM 2 MG: 1 INJECTION INTRAMUSCULAR; INTRAVENOUS at 14:43

## 2021-10-03 RX ADMIN — PIPERACILLIN SODIUM AND TAZOBACTAM SODIUM 3.38 G: 3; .375 INJECTION, POWDER, LYOPHILIZED, FOR SOLUTION INTRAVENOUS at 09:30

## 2021-10-03 RX ADMIN — GLYCOPYRROLATE 0.4 MG: 0.2 INJECTION, SOLUTION INTRAMUSCULAR; INTRAVENOUS at 15:52

## 2021-10-03 RX ADMIN — NEOSTIGMINE 3 MG: 1 INJECTION INTRAVENOUS at 15:52

## 2021-10-03 RX ADMIN — PROPOFOL 150 MG: 10 INJECTION, EMULSION INTRAVENOUS at 15:08

## 2021-10-03 RX ADMIN — HYDROMORPHONE HYDROCHLORIDE 0.5 MG: 1 INJECTION, SOLUTION INTRAMUSCULAR; INTRAVENOUS; SUBCUTANEOUS at 06:00

## 2021-10-03 RX ADMIN — HYDROMORPHONE HYDROCHLORIDE 0.5 MG: 1 INJECTION, SOLUTION INTRAMUSCULAR; INTRAVENOUS; SUBCUTANEOUS at 08:24

## 2021-10-03 RX ADMIN — TRAMADOL HYDROCHLORIDE 50 MG: 50 TABLET, FILM COATED ORAL at 10:51

## 2021-10-03 RX ADMIN — ONDANSETRON 4 MG: 2 INJECTION INTRAMUSCULAR; INTRAVENOUS at 15:08

## 2021-10-03 RX ADMIN — ONDANSETRON 4 MG: 2 INJECTION INTRAMUSCULAR; INTRAVENOUS at 06:00

## 2021-10-03 RX ADMIN — LIDOCAINE HYDROCHLORIDE 60 MG: 20 INJECTION, SOLUTION INFILTRATION; PERINEURAL at 15:08

## 2021-10-03 RX ADMIN — PIPERACILLIN SODIUM AND TAZOBACTAM SODIUM 3.38 G: 3; .375 INJECTION, POWDER, LYOPHILIZED, FOR SOLUTION INTRAVENOUS at 15:09

## 2021-10-03 NOTE — ED NOTES
Report received from Shirley Sarmiento RN. Pt resting on stretcher A&Ox4, RR even and unlabored, skin WPD. Pt vitals WNL, Pt requesting something else for pain, rating pain 8/10, provider made aware. Pt updated about POC with verbalized understanding. YARELIS. WCTM. Safety measures remain WDL.      Jordyn Monreal RN  10/03/21 7363

## 2021-10-03 NOTE — ED NOTES
Pt departing ED en route to surgery. Pt is leaving A&Ox4, RR even and unlabored, skin WPD. Pt is leaving with vitals WNL, reports pain is controlled and tolerable. Pt is leaving with 18 gauge to RUE clean, dry, intact, patent. Pt does not appear in any acute distress.      Jordyn Monreal, RN  10/03/21 4425

## 2021-10-03 NOTE — ED NOTES
Pt surgical consent signed by patient as discussed with Dr. Perales; form placed on Pt chart.      Jordyn Monreal RN  10/03/21 4274

## 2021-10-03 NOTE — ANESTHESIA POSTPROCEDURE EVALUATION
Patient: Erika Alfonso    Procedure Summary     Date: 10/03/21 Room / Location: [facility] OR 01 /  [facility] OR    Anesthesia Start: 1443 Anesthesia Stop: 1600    Procedure: APPENDECTOMY LAPAROSCOPIC (N/A Abdomen) Diagnosis:       Acute appendicitis, unspecified acute appendicitis type      (Acute appendicitis, unspecified acute appendicitis type [K35.80])    Surgeons: Nick Perales MD Provider: Moilna Woods MD    Anesthesia Type: general with block ASA Status: 3          Anesthesia Type: general with block    Vitals  Vitals Value Taken Time   /65 10/03/21 1611   Temp 97.1 °F (36.2 °C) 10/03/21 1601   Pulse 85 10/03/21 1616   Resp 22 10/03/21 1616   SpO2 100 % 10/03/21 1616           Post Anesthesia Care and Evaluation    Patient location during evaluation: PHASE II  Patient participation: complete - patient participated  Level of consciousness: lethargic  Pain score: 1  Pain management: adequate  Airway patency: patent  Anesthetic complications: No anesthetic complications  PONV Status: controlled  Cardiovascular status: acceptable  Respiratory status: acceptable  Hydration status: acceptable

## 2021-10-03 NOTE — H&P
Patient Name:  Erika Alfonso  YOB: 1970  [id number]       Patient Care Team:  Sammy Crowley DO as PCP - General (Family Medicine)      General Surgery Consult Note     Date of Consultation: 10/03/21    Consulting Physician - Tal Borjas MD    Reason for Consult -appendicitis    Subjective     I have been asked to see  Erika Alfonso , a 51 y.o. female in consultation for abdominal pain.  Patient reports development of mild right lower quadrant abdominal pain about a week ago.  She thought she had pulled a muscle.  However, this acutely worsened yesterday around 7 PM.  Pain was again in the right lower quadrant and this was associated with nausea.  Denies vomiting.  She denies diarrhea or sick contacts.  Due to the worsening of her symptoms, she presented to the emergency department.    Work-up was concerning for acute appendicitis and general surgery was consulted.  Upon my evaluation, patient was in no acute distress.  Tender to palpation the right lower quadrant.    N.p.o. since 7 PM yesterday  No blood thinners.  No history of MI or CVA    Allergy: No Known Allergies    Medications:  acetaminophen, 1,000 mg, Oral, Q6H  famotidine, 20 mg, Intravenous, BID         No current facility-administered medications on file prior to encounter.     Current Outpatient Medications on File Prior to Encounter   Medication Sig   • ARIPiprazole (ABILIFY) 10 MG tablet Take 10 mg by mouth Daily.   • lamoTRIgine (LaMICtal) 100 MG tablet    • lamoTRIgine (LaMICtal) 150 MG tablet    • Liraglutide (VICTOZA) 18 MG/3ML solution pen-injector injection Inject 1.2 mg under the skin into the appropriate area as directed Daily.   • Phendimetrazine Tartrate 35 MG tablet Take 1 tablet at 3 pm.   • venlafaxine XR (EFFEXOR-XR) 150 MG 24 hr capsule TK 1 C PO D       PMHx:   Past Medical History:   Diagnosis Date   • Bell's palsy    • Constipation    • Depression    • PCOS (polycystic ovarian syndrome)          Past  "Surgical History:  Multiple GYN surgeries that culminated into hysterectomy and bilateral salpingo-oophorectomy    Family History: Breast cancer.  Coronary artery disease    Social History: Non-smoker, occasional alcohol     Review of Systems        Constitutional: No fevers, chills or malaise. No unintentional weight loss   Eyes: Denies visual changes    Cardiovascular: Denies chest pain, palpitations   Respiratory: Denies cough or shortness of breath   Abdominal/Gastrointestinal: Reports abdominal pain and nausea.  No vomiting or diarrhea   Genitourinary: Denies dysuria or hematuria   Musculoskeletal: Denies any but chronic joint aches, pains or deformities              Skin: No lesions or rashes   Psychiatric: Reports baseline anxiety   Neurologic: No paresthesias or loss of function             Objective     Physical Exam:      Vital Signs  /73   Pulse 71   Temp 97.9 °F (36.6 °C) (Oral)   Resp 16   Ht 167.6 cm (66\")   Wt 97.5 kg (215 lb)   SpO2 97%   BMI 34.70 kg/m²     Intake/Output Summary (Last 24 hours) at 10/3/2021 1223  Last data filed at 10/3/2021 1000  Gross per 24 hour   Intake 100 ml   Output --   Net 100 ml         Physical Exam:      10/03/21  0330   Weight: 97.5 kg (215 lb)    Body mass index is 34.7 kg/m².  Constitution: No acute distress.  Obese  Head: Normocephalic, atraumatic.   Eyes: Aligned without strabismus. Conjunctiva noninjected   Ears, Nose, Mouth: , No lesions appreciated   CV: Rhythm  and rate regular   Respiratory: Symmetric chest expansion. No respiratory distress.   Gastrointestinal:  soft, nondistended, tender to palpation the right lower quadrant without peritonitis  Skin:  No cyanosis, clubbing or edema bilaterally    Lymphatics: No abnormal cervical or supraclavicular adenopathy  Neurologic: No gross deficits.  Alert and oriented x3  Psychiatric: Normal mood        Results Review: I have personally reviewed all of the recent lab and imaging results available at this " time.     Lab Results (last 24 hours)     Procedure Component Value Units Date/Time    COVID PRE-OP / PRE-PROCEDURE SCREENING ORDER (NO ISOLATION) - Swab, Nasopharynx [[id number]]  (Normal) Collected: 10/03/21 0437    Specimen: Swab from Nasopharynx Updated: 10/03/21 0512    Narrative:      The following orders were created for panel order COVID PRE-OP / PRE-PROCEDURE SCREENING ORDER (NO ISOLATION) - Swab, Nasopharynx.  Procedure                               Abnormality         Status                     ---------                               -----------         ------                     COVID-19 and FLU A/B PCR...[[id number]]  Normal              Final result                 Please view results for these tests on the individual orders.    Urinalysis With Culture If Indicated - Urine, Clean Catch [[id number]]  (Abnormal) Collected: 10/03/21 0437    Specimen: Urine, Clean Catch Updated: 10/03/21 0450     Color, UA Yellow     Appearance, UA Clear     pH, UA 5.5     Specific Gravity, UA 1.019     Glucose, UA Negative     Ketones, UA Negative     Bilirubin, UA Negative     Blood, UA Negative     Protein, UA Negative     Leuk Esterase, UA Small (1+)     Nitrite, UA Negative     Urobilinogen, UA 1.0 E.U./dL    COVID-19 and FLU A/B PCR - Swab, Nasopharynx [[id number]]  (Normal) Collected: 10/03/21 0437    Specimen: Swab from Nasopharynx Updated: 10/03/21 0512     COVID19 Not Detected     Influenza A PCR Not Detected     Influenza B PCR Not Detected    Narrative:      Fact sheet for providers: https://www.fda.gov/media/158916/download    Fact sheet for patients: https://www.fda.gov/media/735340/download    Test performed by PCR.    Urinalysis, Microscopic Only - Urine, Clean Catch [[id number]]  (Abnormal) Collected: 10/03/21 0437    Specimen: Urine, Clean Catch Updated: 10/03/21 0450     RBC, UA 0-2 /HPF      WBC, UA 3-5 /HPF      Bacteria, UA None Seen /HPF      Squamous Epithelial Cells, UA 0-2 /HPF      Hyaline Casts, UA  None Seen /LPF      Methodology Automated Microscopy    CBC & Differential [[id number]]  (Abnormal) Collected: 10/03/21 0607    Specimen: Blood Updated: 10/03/21 0636    Narrative:      The following orders were created for panel order CBC & Differential.  Procedure                               Abnormality         Status                     ---------                               -----------         ------                     CBC Auto Differential[[id number]]        Abnormal            Final result                 Please view results for these tests on the individual orders.    CBC Auto Differential [[id number]]  (Abnormal) Collected: 10/03/21 0607    Specimen: Blood Updated: 10/03/21 0636     WBC 9.22 10*3/mm3      RBC 4.80 10*6/mm3      Hemoglobin 13.4 g/dL      Hematocrit 40.6 %      MCV 84.6 fL      MCH 27.9 pg      MCHC 33.0 g/dL      RDW 13.0 %      RDW-SD 39.8 fl      MPV 9.2 fL      Platelets 203 10*3/mm3      Neutrophil % 80.8 %      Lymphocyte % 11.4 %      Monocyte % 5.6 %      Eosinophil % 1.4 %      Basophil % 0.5 %      Immature Grans % 0.3 %      Neutrophils, Absolute 7.44 10*3/mm3      Lymphocytes, Absolute 1.05 10*3/mm3      Monocytes, Absolute 0.52 10*3/mm3      Eosinophils, Absolute 0.13 10*3/mm3      Basophils, Absolute 0.05 10*3/mm3      Immature Grans, Absolute 0.03 10*3/mm3      nRBC 0.0 /100 WBC     Comprehensive Metabolic Panel [[id number]]  (Abnormal) Collected: 10/03/21 0625    Specimen: Blood from Arm, Right Updated: 10/03/21 0653     Glucose 114 mg/dL      BUN 9 mg/dL      Creatinine 0.77 mg/dL      Sodium 134 mmol/L      Potassium 3.7 mmol/L      Chloride 101 mmol/L      CO2 23.0 mmol/L      Calcium 8.4 mg/dL      Total Protein 6.5 g/dL      Albumin 3.97 g/dL      ALT (SGPT) 13 U/L      AST (SGOT) 16 U/L      Alkaline Phosphatase 128 U/L      Total Bilirubin 0.4 mg/dL      eGFR Non African Amer 79 mL/min/1.73      Globulin 2.5 gm/dL      A/G Ratio 1.6 g/dL      BUN/Creatinine Ratio 11.7      Anion Gap 10.0 mmol/L     Narrative:      GFR Normal >60  Chronic Kidney Disease <60  Kidney Failure <15      aPTT [[id number]]  (Normal) Collected: 10/03/21 0625    Specimen: Blood from Arm, Right Updated: 10/03/21 0656     PTT 29.2 seconds     Narrative:      PTT Heparin Therapeutic Range:  59 - 95 seconds      Protime-INR [[id number]]  (Abnormal) Collected: 10/03/21 0625    Specimen: Blood from Arm, Right Updated: 10/03/21 0656     Protime 12.7 Seconds      INR 0.91    Narrative:      Suggested INR therapeutic range for stable oral anticoagulant therapy:    Low Intensity therapy:   1.5-2.0  Moderate Intensity therapy:   2.0-3.0  High Intensity therapy:   2.5-4.0    Lipase [[id number]]  (Normal) Collected: 10/03/21 0625    Specimen: Blood from Arm, Right Updated: 10/03/21 0653     Lipase 29 U/L     C-reactive Protein [[id number]]  (Abnormal) Collected: 10/03/21 0625    Specimen: Blood from Arm, Right Updated: 10/03/21 0653     C-Reactive Protein 0.99 mg/dL     Procalcitonin [[id number]] Collected: 10/03/21 0625    Specimen: Blood from Arm, Right Updated: 10/03/21 0630    Magnesium [[id number]]  (Normal) Collected: 10/03/21 0625    Specimen: Blood from Arm, Right Updated: 10/03/21 0653     Magnesium 1.6 mg/dL          I personally viewed the patient's CT abdomen pelvis which demonstrates a dilated and inflamed appendix with surrounding fat stranding lateral to the cecum.     Assessment/Plan     Assessment and Plan:    51 y.o. obese female with acute appendicitis    N.p.o.  Zosyn  To the OR for laparoscopic appendectomy.  Discharge postop if nonperforated        Nick Perales MD  [facility]  10/03/21  12:23 EDT

## 2021-10-03 NOTE — OP NOTE
Operative Report    Patient Name:  Erika Alfonso  YOB: 1970  [id number]    10/3/2021      PREOPERATIVE DIAGNOSIS: Acute appendicitis       POSTOPERATIVE DIAGNOSIS: Same        PROCEDURE PERFORMED: Laparoscopic Appendectomy        SURGEON: Nick Perales MD         SPECIMENS: Appendix and contents        ANESTHESIA: General. Local       FINDINGS:  1. Acutely inflamed, dilated appendix without  perforation   2. Based on the thickened wall with fibrinous exudate, infection was present without  spillage of enteric contents       INDICATIONS:      The patient is a 51 y.o. female with a history of abdominal pain, concerning for acute appendicitis . Pre-operative imaging including CT scan  confirmed the diagnosis. The risks and benefits of laparoscopic appendectomy, possible open were discussed with the patient and their family and they agree to proceed.          DESCRIPTION OF PROCEDURE:      After obtaining informed consent, the patient was taken to the operating room and placed in the supine position. After appropriate DVT and antibiotic prophylaxis, general anesthesia was induced. The abdomen was prepped and draped in standard sterile fashion.      After administration of local anesthetic, a stab incision was made at Hsieh's point in the left upper quadrant.  Abdomen was entered with a 5 mm first entry applied trocar.  Insufflated to 50 mmHg.  Periumbilical 10 mm trocar was placed under direct laparoscopic visualization.  Similarly, 5 mm assist trochars were placed in the left lower quadrant and suprapubic area.         The right lower quadrant was inspected. There was an acutely inflamed appendix identified, lateral to the cecum.  It was very enlarged and thickened.  There is fibrinous exudate and inflammatory changes surrounding it. Using meticulous blunt dissection  the base of the appendix was circumferentially cleared, and the appendix was divided flush with the cecum using a stapling device,  with care taken not to encroach upon the ileocecal valve. An additional firing of the stapler was taken across the appendiceal mesentery, the appendix placed in an Endo Catch bag, and removed through the infraumbilical trocar site. The specimen was examined on the back table, was complete, and passed off as specimen.     The mesenteric and cecal staple lines were inspected, and free of bleeding or leak.    Some Narciso was placed in the right lower quadrant to minimize risk of perioperative fluid collection.    The fascia at the periumbilical incision  was closed with a 0 Vicryl in a figure of 8 manner utilizing the Naif Argelia device.  Right lower quadrant was examined and there were no signs of bleeding. Trocars removed. skin  closed  using absorbable subcuticular suture. Skin Affix placed on incisions. The patient recovered from anesthesia, was extubated in the operating room, and transferred to the PACU in stable condition.  All sponge and needle counts were correct times two at the completion of the procedure. There were no immediate complications.         Nick Perales MD  10/3/2021  16:00 EDT

## 2021-10-03 NOTE — ED PROVIDER NOTES
[facility]  emergency department encounter        Pt Name: Erika Alfonso  MRN: [id number]  Birthdate 1970  Date of evaluation: 10/3/2021    Chief Complaint   Patient presents with   • Abdominal Pain             HISTORY OF PRESENT ILLNESS:   Erika Alfonso is a 51 y.o. female PMH Bell's palsy, PCOS, constipation, SERGO +BSO presents complaining of right lower quadrant abdominal pain.  Started yesterday at 6 PM.  It was mild then but has been constant and progressively worsening.  Pain is sharp.  Patient reports it is felt like prior ruptured ovarian cyst however patient only has ovaries.  Patient endorses nausea without vomiting.  Otherwise broadly denies complaints.      No other exacerbating or alleviating factors other than as noted above.  Severity: Severe    PCP: Sammy Crowley DO          REVIEW OF SYSTEMS:     Review of Systems   Constitutional: Negative for fever.   HENT: Negative for congestion and rhinorrhea.    Respiratory: Negative for cough and shortness of breath.    Cardiovascular: Negative for chest pain.   Gastrointestinal: Positive for abdominal pain and nausea. Negative for vomiting.   Genitourinary: Negative for dysuria.   Musculoskeletal: Negative for myalgias.   Skin: Negative for rash.   Neurological: Negative for headaches.   Psychiatric/Behavioral: Negative for confusion.       Review of systems otherwise as per HPI.          PREVIOUS HISTORY:         Past Medical History:   Diagnosis Date   • Bell's palsy    • Constipation    • Depression    • PCOS (polycystic ovarian syndrome)            Past Surgical History:   Procedure Laterality Date   • BREAST SURGERY     • ENDOMETRIAL ABLATION     • LAPAROSCOPIC TUBAL LIGATION     • OVARIAN CYST REMOVAL             Social History     Socioeconomic History   • Marital status:      Spouse name: Not on file   • Number of children: Not on file   • Years of education: Not on file   • Highest education level: Not on file    Tobacco Use   • Smoking status: Never Smoker   • Smokeless tobacco: Never Used   Substance and Sexual Activity   • Alcohol use: Yes     Alcohol/week: 4.0 standard drinks     Types: 4 Cans of beer per week     Comment: Occasionally   • Drug use: No   • Sexual activity: Defer         History reviewed. No pertinent family history.      Current Outpatient Medications   Medication Instructions   • ARIPiprazole (ABILIFY) 10 mg, Oral, Daily   • lamoTRIgine (LaMICtal) 100 MG tablet No dose, route, or frequency recorded.   • lamoTRIgine (LaMICtal) 150 MG tablet No dose, route, or frequency recorded.   • Liraglutide (VICTOZA) 1.2 mg, Subcutaneous, Daily   • Phendimetrazine Tartrate 35 MG tablet Take 1 tablet at 3 pm.   • venlafaxine XR (EFFEXOR-XR) 150 MG 24 hr capsule TK 1 C PO D         Allergies:  Patient has no known allergies.    Medications, allergies and past medical, surgical, family, and social history reviewed.        PHYSICAL EXAM:     Physical Exam  Vitals and nursing note reviewed.   Constitutional:       General: She is not in acute distress.  HENT:      Head: Normocephalic and atraumatic.   Eyes:      Extraocular Movements: Extraocular movements intact.      Conjunctiva/sclera: Conjunctivae normal.   Pulmonary:      Effort: Pulmonary effort is normal. No respiratory distress.   Abdominal:      General: Abdomen is flat. There is no distension.      Palpations: Abdomen is soft.      Tenderness: There is abdominal tenderness. There is rebound. There is no guarding.      Comments: Patient jumps to light palpation   Musculoskeletal:         General: No deformity. Normal range of motion.      Cervical back: Normal range of motion.   Neurological:      General: No focal deficit present.      Mental Status: She is alert and oriented to person, place, and time.   Psychiatric:         Mood and Affect: Mood normal.         Behavior: Behavior normal.             COMPLETED DIAGNOSTIC STUDIES AND INTERVENTIONS:     Lab  Results (last 24 hours)     Procedure Component Value Units Date/Time    COVID PRE-OP / PRE-PROCEDURE SCREENING ORDER (NO ISOLATION) - Swab, Nasopharynx [[id number]]  (Normal) Collected: 10/03/21 0437    Specimen: Swab from Nasopharynx Updated: 10/03/21 0512    Narrative:      The following orders were created for panel order COVID PRE-OP / PRE-PROCEDURE SCREENING ORDER (NO ISOLATION) - Swab, Nasopharynx.  Procedure                               Abnormality         Status                     ---------                               -----------         ------                     COVID-19 and FLU A/B PCR...[[id number]]  Normal              Final result                 Please view results for these tests on the individual orders.    Urinalysis With Culture If Indicated - Urine, Clean Catch [[id number]]  (Abnormal) Collected: 10/03/21 0437    Specimen: Urine, Clean Catch Updated: 10/03/21 0450     Color, UA Yellow     Appearance, UA Clear     pH, UA 5.5     Specific Gravity, UA 1.019     Glucose, UA Negative     Ketones, UA Negative     Bilirubin, UA Negative     Blood, UA Negative     Protein, UA Negative     Leuk Esterase, UA Small (1+)     Nitrite, UA Negative     Urobilinogen, UA 1.0 E.U./dL    COVID-19 and FLU A/B PCR - Swab, Nasopharynx [[id number]]  (Normal) Collected: 10/03/21 0437    Specimen: Swab from Nasopharynx Updated: 10/03/21 0512     COVID19 Not Detected     Influenza A PCR Not Detected     Influenza B PCR Not Detected    Narrative:      Fact sheet for providers: https://www.fda.gov/media/206023/download    Fact sheet for patients: https://www.fda.gov/media/003729/download    Test performed by PCR.    Urinalysis, Microscopic Only - Urine, Clean Catch [[id number]]  (Abnormal) Collected: 10/03/21 0437    Specimen: Urine, Clean Catch Updated: 10/03/21 0450     RBC, UA 0-2 /HPF      WBC, UA 3-5 /HPF      Bacteria, UA None Seen /HPF      Squamous Epithelial Cells, UA 0-2 /HPF      Hyaline Casts, UA None Seen /LPF       Methodology Automated Microscopy    CBC & Differential [[id number]]  (Abnormal) Collected: 10/03/21 0607    Specimen: Blood Updated: 10/03/21 0636    Narrative:      The following orders were created for panel order CBC & Differential.  Procedure                               Abnormality         Status                     ---------                               -----------         ------                     CBC Auto Differential[[id number]]        Abnormal            Final result                 Please view results for these tests on the individual orders.    CBC Auto Differential [[id number]]  (Abnormal) Collected: 10/03/21 0607    Specimen: Blood Updated: 10/03/21 0636     WBC 9.22 10*3/mm3      RBC 4.80 10*6/mm3      Hemoglobin 13.4 g/dL      Hematocrit 40.6 %      MCV 84.6 fL      MCH 27.9 pg      MCHC 33.0 g/dL      RDW 13.0 %      RDW-SD 39.8 fl      MPV 9.2 fL      Platelets 203 10*3/mm3      Neutrophil % 80.8 %      Lymphocyte % 11.4 %      Monocyte % 5.6 %      Eosinophil % 1.4 %      Basophil % 0.5 %      Immature Grans % 0.3 %      Neutrophils, Absolute 7.44 10*3/mm3      Lymphocytes, Absolute 1.05 10*3/mm3      Monocytes, Absolute 0.52 10*3/mm3      Eosinophils, Absolute 0.13 10*3/mm3      Basophils, Absolute 0.05 10*3/mm3      Immature Grans, Absolute 0.03 10*3/mm3      nRBC 0.0 /100 WBC     Comprehensive Metabolic Panel [[id number]]  (Abnormal) Collected: 10/03/21 0625    Specimen: Blood from Arm, Right Updated: 10/03/21 0653     Glucose 114 mg/dL      BUN 9 mg/dL      Creatinine 0.77 mg/dL      Sodium 134 mmol/L      Potassium 3.7 mmol/L      Chloride 101 mmol/L      CO2 23.0 mmol/L      Calcium 8.4 mg/dL      Total Protein 6.5 g/dL      Albumin 3.97 g/dL      ALT (SGPT) 13 U/L      AST (SGOT) 16 U/L      Alkaline Phosphatase 128 U/L      Total Bilirubin 0.4 mg/dL      eGFR Non African Amer 79 mL/min/1.73      Globulin 2.5 gm/dL      A/G Ratio 1.6 g/dL      BUN/Creatinine Ratio 11.7     Anion Gap 10.0  mmol/L     Narrative:      GFR Normal >60  Chronic Kidney Disease <60  Kidney Failure <15      aPTT [[id number]]  (Normal) Collected: 10/03/21 0625    Specimen: Blood from Arm, Right Updated: 10/03/21 0656     PTT 29.2 seconds     Narrative:      PTT Heparin Therapeutic Range:  59 - 95 seconds      Protime-INR [[id number]]  (Abnormal) Collected: 10/03/21 0625    Specimen: Blood from Arm, Right Updated: 10/03/21 0656     Protime 12.7 Seconds      INR 0.91    Narrative:      Suggested INR therapeutic range for stable oral anticoagulant therapy:    Low Intensity therapy:   1.5-2.0  Moderate Intensity therapy:   2.0-3.0  High Intensity therapy:   2.5-4.0    Lipase [[id number]]  (Normal) Collected: 10/03/21 0625    Specimen: Blood from Arm, Right Updated: 10/03/21 0653     Lipase 29 U/L     C-reactive Protein [[id number]]  (Abnormal) Collected: 10/03/21 0625    Specimen: Blood from Arm, Right Updated: 10/03/21 0653     C-Reactive Protein 0.99 mg/dL     Procalcitonin [[id number]] Collected: 10/03/21 0625    Specimen: Blood from Arm, Right Updated: 10/03/21 0630    Magnesium [[id number]]  (Normal) Collected: 10/03/21 0625    Specimen: Blood from Arm, Right Updated: 10/03/21 0653     Magnesium 1.6 mg/dL             CT Abdomen Pelvis With Contrast   Final Result   CT findings compatible with acute appendicitis with the appendix lying lateral to the cecum and above the right iliac crest within the right lower quadrant. No perforation or abscess.               Signer Name: HARPREET Rojas MD    Signed: 10/3/2021 8:39 AM    Workstation Name: [email or web address]     [facility]            New Medications Ordered This Visit   Medications   • sodium chloride 0.9 % bolus 1,000 mL   • HYDROmorphone (DILAUDID) injection 0.5 mg   • ondansetron (ZOFRAN) injection 4 mg   • iopamidol (ISOVUE-300) 61 % injection 100 mL   • HYDROmorphone (DILAUDID) injection 0.5 mg   • piperacillin-tazobactam (ZOSYN) IVPB 3.375 g in 100 mL NS  VTB         Procedures            MEDICAL DECISION-MAKING AND ED COURSE:     ED Course as of Oct 03 0902   Sun Oct 03, 2021   0356 MDM: 51-year-old female with prior SERGO presents with right lower quadrant abdominal pain predominantly concerning for acute appendicitis.  CT contrast imaging pending but will be delayed due to ED overcrowding and lack of bed space.    [KP]   0548 There is enough unable to place IV.  IV placed by bedside physician ultrasound.  Placement confirmed with visualization on ultrasound, easy blood return, easy flush.  Patient tolerated well.  Procedure completed sterilely.    [KP]   0549 UA negative for infection.    [KP]   0549 COVID19: Not Detected [KP]   0858 IMPRESSION:  CT findings compatible with acute appendicitis with the appendix lying lateral to the cecum and above the right iliac crest within the right lower quadrant. No perforation or abscess.   CT Abdomen Pelvis With Contrast [KP]   0859 Discussed case with Dr. Perales, recommended Zosyn, will be starting the case and plan to see patient afterwards.    [KP]      ED Course User Index  [KP] Vinny hSah MD       ?      FINAL IMPRESSION:       1. Acute appendicitis, unspecified acute appendicitis type         The complaints listed here are new problems to this examiner.      FOLLOW-UP  No follow-up provider specified.    DISPOSITION  ED Disposition     ED Disposition Condition Comment    Decision to Admit                  This care is provided during an unprecedented national emergency due to the Novel Coronavirus (COVID-19). COVID-19 infections and transmission risks place heavy strains on healthcare resources. As this pandemic evolves, the Hospital and providers strive to respond fluidly, to remain operational, and to provide care relative to available resources and information. Outcomes are unpredictable and treatments are without well-defined guidelines. Further, the impact of COVID-19 on all aspects of emergency care,  including the impact to patients seeking care for reasons other than COVID-19, is unavoidable during this national emergency.    This note was dictated using a [id number] tool. Occasional wrong-word or 'sound-a-like' substitutions may have occurred due to the inherent limitations of voice recognition software. ?Read the chart carefully and recognize, using context, where substitutions have occurred.    Vinny Shah MD  09:02 EDT  10/3/2021             Vinny Shah MD  10/03/21 0902

## 2021-10-03 NOTE — ANESTHESIA PROCEDURE NOTES
Airway  Urgency: elective    Date/Time: 10/3/2021 3:08 PM  End Time:10/3/2021 3:08 PM  Airway not difficult    General Information and Staff    Patient location during procedure: OR  CRNA: Taj Mckenzie CRNA    Indications and Patient Condition  Indications for airway management: airway protection    Preoxygenated: yes  MILS maintained throughout  Mask difficulty assessment: 0 - not attempted    Final Airway Details  Final airway type: endotracheal airway      Successful airway: ETT  Cuffed: yes   Successful intubation technique: direct laryngoscopy  Facilitating devices/methods: intubating stylet  Endotracheal tube insertion site: oral  Blade: Glidescope  Blade size: 3  ETT size (mm): 7.0  Cormack-Lehane Classification: grade IIb - view of arytenoids or posterior of glottis only  Placement verified by: chest auscultation, capnometry and palpation of cuff   Cuff volume (mL): 10  Measured from: lips  ETT/EBT  to lips (cm): 21  Number of attempts at approach: 1    Additional Comments  Dentition and lips same as preop

## 2021-10-03 NOTE — ANESTHESIA PREPROCEDURE EVALUATION
Anesthesia Evaluation     no history of anesthetic complications:  NPO Solid Status: > 8 hours  NPO Liquid Status: > 8 hours           Airway   Mallampati: II  TM distance: >3 FB  Neck ROM: full  No difficulty expected  Dental - normal exam     Pulmonary - normal exam   Cardiovascular - normal exam    (+) hyperlipidemia,       Neuro/Psych  (+) numbness, psychiatric history Anxiety,     GI/Hepatic/Renal/Endo    (+) obesity,       Musculoskeletal     Abdominal  - normal exam    Bowel sounds: normal.   Substance History      OB/GYN          Other        ROS/Med Hx Other: Binge Eating Disorder,  PCOS                  Anesthesia Plan    ASA 3     general with block       Anesthetic plan, all risks, benefits, and alternatives have been provided, discussed and informed consent has been obtained with: patient.

## 2021-10-04 ENCOUNTER — TELEPHONE (OUTPATIENT)
Dept: MEDSURG UNIT | Facility: HOSPITAL | Age: 51
End: 2021-10-04

## 2021-10-04 NOTE — ED NOTES
Multiple attempts to start pt's IV made by several nurses at this time. Dr. Shah made aware, states he will attempt ultra sound guided, ultrasound and supplies placed at bedside.      Shirley Walton RN  10/03/21 2033

## 2021-10-08 LAB — LAB AP CASE REPORT: NORMAL

## 2022-10-10 NOTE — DISCHARGE INSTRUCTIONS
*Shower daily starting 10/4.  Allow warm, soapy water to run over your incision.  Pat dry do not submerge wound for 2 weeks  *No lifting greater than 15 pounds for a month to minimize risk of hernia development  *Fevers greater than 101.5, vomiting causing dehydration  
as per pt, "My cardiologist told me to come to the hospital because he said my heart had a 6 second delay before firing again." Pt wears a halter monitor.

## 2022-11-30 ENCOUNTER — APPOINTMENT (OUTPATIENT)
Dept: GENERAL RADIOLOGY | Facility: HOSPITAL | Age: 52
End: 2022-11-30

## 2022-11-30 ENCOUNTER — HOSPITAL ENCOUNTER (OUTPATIENT)
Facility: HOSPITAL | Age: 52
Setting detail: OBSERVATION
Discharge: HOME | End: 2022-12-03
Attending: EMERGENCY MEDICINE | Admitting: HOSPITALIST

## 2022-11-30 ENCOUNTER — APPOINTMENT (OUTPATIENT)
Dept: CT IMAGING | Facility: HOSPITAL | Age: 52
End: 2022-11-30

## 2022-11-30 DIAGNOSIS — J36 TONSILLAR ABSCESS: Primary | ICD-10-CM

## 2022-11-30 LAB
ALBUMIN SERPL-MCNC: 4.2 G/DL (ref 3.5–5.2)
ALBUMIN/GLOB SERPL: 1.4 G/DL
ALP SERPL-CCNC: 137 U/L (ref 39–117)
ALT SERPL W P-5'-P-CCNC: 11 U/L (ref 1–33)
ANION GAP SERPL CALCULATED.3IONS-SCNC: 10 MMOL/L (ref 5–15)
AST SERPL-CCNC: 10 U/L (ref 1–32)
BACTERIA UR QL AUTO: NORMAL /HPF
BASOPHILS # BLD AUTO: 0.03 10*3/MM3 (ref 0–0.2)
BASOPHILS NFR BLD AUTO: 0.4 % (ref 0–1.5)
BILIRUB SERPL-MCNC: 0.6 MG/DL (ref 0–1.2)
BILIRUB UR QL STRIP: NEGATIVE
BUN SERPL-MCNC: 11 MG/DL (ref 6–20)
BUN/CREAT SERPL: 13.1 (ref 7–25)
CALCIUM SPEC-SCNC: 9 MG/DL (ref 8.6–10.5)
CHLORIDE SERPL-SCNC: 101 MMOL/L (ref 98–107)
CLARITY UR: ABNORMAL
CO2 SERPL-SCNC: 27 MMOL/L (ref 22–29)
COLOR UR: YELLOW
CREAT SERPL-MCNC: 0.84 MG/DL (ref 0.57–1)
D-LACTATE SERPL-SCNC: 0.6 MMOL/L (ref 0.5–2)
DEPRECATED RDW RBC AUTO: 39.8 FL (ref 37–54)
EGFRCR SERPLBLD CKD-EPI 2021: 83.7 ML/MIN/1.73
EOSINOPHIL # BLD AUTO: 0.13 10*3/MM3 (ref 0–0.4)
EOSINOPHIL NFR BLD AUTO: 1.7 % (ref 0.3–6.2)
ERYTHROCYTE [DISTWIDTH] IN BLOOD BY AUTOMATED COUNT: 12.4 % (ref 12.3–15.4)
FLUAV SUBTYP SPEC NAA+PROBE: NOT DETECTED
FLUBV RNA ISLT QL NAA+PROBE: NOT DETECTED
GLOBULIN UR ELPH-MCNC: 3 GM/DL
GLUCOSE SERPL-MCNC: 92 MG/DL (ref 65–99)
GLUCOSE UR STRIP-MCNC: NEGATIVE MG/DL
HCT VFR BLD AUTO: 38.2 % (ref 34–46.6)
HETEROPH AB SER QL LA: NEGATIVE
HGB BLD-MCNC: 12.6 G/DL (ref 12–15.9)
HGB UR QL STRIP.AUTO: NEGATIVE
HYALINE CASTS UR QL AUTO: NORMAL /LPF
IMM GRANULOCYTES # BLD AUTO: 0.02 10*3/MM3 (ref 0–0.05)
IMM GRANULOCYTES NFR BLD AUTO: 0.3 % (ref 0–0.5)
KETONES UR QL STRIP: NEGATIVE
LEUKOCYTE ESTERASE UR QL STRIP.AUTO: ABNORMAL
LYMPHOCYTES # BLD AUTO: 1.51 10*3/MM3 (ref 0.7–3.1)
LYMPHOCYTES NFR BLD AUTO: 20.2 % (ref 19.6–45.3)
MCH RBC QN AUTO: 28.8 PG (ref 26.6–33)
MCHC RBC AUTO-ENTMCNC: 33 G/DL (ref 31.5–35.7)
MCV RBC AUTO: 87.4 FL (ref 79–97)
MONOCYTES # BLD AUTO: 0.62 10*3/MM3 (ref 0.1–0.9)
MONOCYTES NFR BLD AUTO: 8.3 % (ref 5–12)
MRSA DNA SPEC QL NAA+PROBE: NEGATIVE
NEUTROPHILS NFR BLD AUTO: 5.17 10*3/MM3 (ref 1.7–7)
NEUTROPHILS NFR BLD AUTO: 69.1 % (ref 42.7–76)
NITRITE UR QL STRIP: NEGATIVE
NRBC BLD AUTO-RTO: 0 /100 WBC (ref 0–0.2)
PH UR STRIP.AUTO: 5.5 [PH] (ref 5–8)
PLATELET # BLD AUTO: 213 10*3/MM3 (ref 140–450)
PMV BLD AUTO: 9 FL (ref 6–12)
POTASSIUM SERPL-SCNC: 4.1 MMOL/L (ref 3.5–5.2)
PROT SERPL-MCNC: 7.2 G/DL (ref 6–8.5)
PROT UR QL STRIP: NEGATIVE
RBC # BLD AUTO: 4.37 10*6/MM3 (ref 3.77–5.28)
RBC # UR STRIP: NORMAL /HPF
REF LAB TEST METHOD: NORMAL
S PYO AG THROAT QL: NEGATIVE
SARS-COV-2 RNA PNL SPEC NAA+PROBE: NOT DETECTED
SODIUM SERPL-SCNC: 138 MMOL/L (ref 136–145)
SP GR UR STRIP: <=1.005 (ref 1–1.03)
SQUAMOUS #/AREA URNS HPF: NORMAL /HPF
TROPONIN T SERPL-MCNC: <0.01 NG/ML (ref 0–0.03)
UROBILINOGEN UR QL STRIP: ABNORMAL
WBC # UR STRIP: NORMAL /HPF
WBC NRBC COR # BLD: 7.48 10*3/MM3 (ref 3.4–10.8)

## 2022-11-30 PROCEDURE — 87636 SARSCOV2 & INF A&B AMP PRB: CPT | Performed by: EMERGENCY MEDICINE

## 2022-11-30 PROCEDURE — 84484 ASSAY OF TROPONIN QUANT: CPT | Performed by: EMERGENCY MEDICINE

## 2022-11-30 PROCEDURE — 87641 MR-STAPH DNA AMP PROBE: CPT | Performed by: INTERNAL MEDICINE

## 2022-11-30 PROCEDURE — 93005 ELECTROCARDIOGRAM TRACING: CPT | Performed by: EMERGENCY MEDICINE

## 2022-11-30 PROCEDURE — 87880 STREP A ASSAY W/OPTIC: CPT | Performed by: INTERNAL MEDICINE

## 2022-11-30 PROCEDURE — 25010000002 VANCOMYCIN 10 G RECONSTITUTED SOLUTION: Performed by: INTERNAL MEDICINE

## 2022-11-30 PROCEDURE — G0378 HOSPITAL OBSERVATION PER HR: HCPCS

## 2022-11-30 PROCEDURE — 96365 THER/PROPH/DIAG IV INF INIT: CPT

## 2022-11-30 PROCEDURE — 25010000002 AMPICILLIN-SULBACTAM PER 1.5 G: Performed by: INTERNAL MEDICINE

## 2022-11-30 PROCEDURE — 80053 COMPREHEN METABOLIC PANEL: CPT | Performed by: EMERGENCY MEDICINE

## 2022-11-30 PROCEDURE — 71045 X-RAY EXAM CHEST 1 VIEW: CPT

## 2022-11-30 PROCEDURE — 99220 PR INITIAL OBSERVATION CARE/DAY 70 MINUTES: CPT | Performed by: INTERNAL MEDICINE

## 2022-11-30 PROCEDURE — 87040 BLOOD CULTURE FOR BACTERIA: CPT | Performed by: INTERNAL MEDICINE

## 2022-11-30 PROCEDURE — 87081 CULTURE SCREEN ONLY: CPT | Performed by: INTERNAL MEDICINE

## 2022-11-30 PROCEDURE — C9803 HOPD COVID-19 SPEC COLLECT: HCPCS

## 2022-11-30 PROCEDURE — 86308 HETEROPHILE ANTIBODY SCREEN: CPT | Performed by: INTERNAL MEDICINE

## 2022-11-30 PROCEDURE — 25010000002 IOPAMIDOL 61 % SOLUTION: Performed by: EMERGENCY MEDICINE

## 2022-11-30 PROCEDURE — 81001 URINALYSIS AUTO W/SCOPE: CPT | Performed by: EMERGENCY MEDICINE

## 2022-11-30 PROCEDURE — 25010000002 PIPERACILLIN SOD-TAZOBACTAM PER 1 G: Performed by: INTERNAL MEDICINE

## 2022-11-30 PROCEDURE — 99284 EMERGENCY DEPT VISIT MOD MDM: CPT

## 2022-11-30 PROCEDURE — 70491 CT SOFT TISSUE NECK W/DYE: CPT

## 2022-11-30 PROCEDURE — 96367 TX/PROPH/DG ADDL SEQ IV INF: CPT

## 2022-11-30 PROCEDURE — 83605 ASSAY OF LACTIC ACID: CPT | Performed by: EMERGENCY MEDICINE

## 2022-11-30 PROCEDURE — 85025 COMPLETE CBC W/AUTO DIFF WBC: CPT | Performed by: EMERGENCY MEDICINE

## 2022-11-30 RX ORDER — LAMOTRIGINE 100 MG/1
100 TABLET ORAL DAILY
Status: DISCONTINUED | OUTPATIENT
Start: 2022-11-30 | End: 2022-11-30

## 2022-11-30 RX ORDER — ENOXAPARIN SODIUM 100 MG/ML
40 INJECTION SUBCUTANEOUS DAILY
Status: DISCONTINUED | OUTPATIENT
Start: 2022-12-01 | End: 2022-12-03 | Stop reason: HOSPADM

## 2022-11-30 RX ORDER — KETOROLAC TROMETHAMINE 30 MG/ML
30 INJECTION, SOLUTION INTRAMUSCULAR; INTRAVENOUS EVERY 6 HOURS PRN
Status: DISCONTINUED | OUTPATIENT
Start: 2022-11-30 | End: 2022-12-03 | Stop reason: HOSPADM

## 2022-11-30 RX ORDER — SODIUM CHLORIDE 0.9 % (FLUSH) 0.9 %
10 SYRINGE (ML) INJECTION EVERY 12 HOURS SCHEDULED
Status: DISCONTINUED | OUTPATIENT
Start: 2022-11-30 | End: 2022-12-03 | Stop reason: HOSPADM

## 2022-11-30 RX ORDER — LEVOTHYROXINE SODIUM 88 UG/1
88 TABLET ORAL DAILY
COMMUNITY

## 2022-11-30 RX ORDER — ARIPIPRAZOLE 10 MG/1
10 TABLET ORAL NIGHTLY
Status: DISCONTINUED | OUTPATIENT
Start: 2022-11-30 | End: 2022-12-03 | Stop reason: HOSPADM

## 2022-11-30 RX ORDER — PREDNISONE 10 MG/1
10 TABLET ORAL DAILY
COMMUNITY
End: 2022-12-03 | Stop reason: HOSPADM

## 2022-11-30 RX ORDER — HYDROCODONE BITARTRATE AND ACETAMINOPHEN 5; 325 MG/1; MG/1
1 TABLET ORAL EVERY 6 HOURS PRN
Status: DISCONTINUED | OUTPATIENT
Start: 2022-11-30 | End: 2022-11-30

## 2022-11-30 RX ORDER — ONDANSETRON 2 MG/ML
4 INJECTION INTRAMUSCULAR; INTRAVENOUS EVERY 6 HOURS PRN
Status: DISCONTINUED | OUTPATIENT
Start: 2022-11-30 | End: 2022-12-03 | Stop reason: HOSPADM

## 2022-11-30 RX ORDER — LEVOTHYROXINE SODIUM 88 UG/1
88 TABLET ORAL DAILY
Status: DISCONTINUED | OUTPATIENT
Start: 2022-12-01 | End: 2022-12-03 | Stop reason: HOSPADM

## 2022-11-30 RX ORDER — ACETAMINOPHEN 325 MG/1
650 TABLET ORAL EVERY 4 HOURS PRN
Status: DISCONTINUED | OUTPATIENT
Start: 2022-11-30 | End: 2022-12-03 | Stop reason: HOSPADM

## 2022-11-30 RX ORDER — BUPROPION HYDROCHLORIDE 150 MG/1
150 TABLET, EXTENDED RELEASE ORAL 2 TIMES DAILY
Status: DISCONTINUED | OUTPATIENT
Start: 2022-11-30 | End: 2022-12-03 | Stop reason: HOSPADM

## 2022-11-30 RX ORDER — LAMOTRIGINE 100 MG/1
100 TABLET ORAL NIGHTLY
Status: DISCONTINUED | OUTPATIENT
Start: 2022-11-30 | End: 2022-12-03 | Stop reason: HOSPADM

## 2022-11-30 RX ORDER — ONDANSETRON 4 MG/1
4 TABLET, FILM COATED ORAL EVERY 6 HOURS PRN
Status: DISCONTINUED | OUTPATIENT
Start: 2022-11-30 | End: 2022-12-03 | Stop reason: HOSPADM

## 2022-11-30 RX ORDER — DEXTROAMPHETAMINE SACCHARATE, AMPHETAMINE ASPARTATE, DEXTROAMPHETAMINE SULFATE AND AMPHETAMINE SULFATE 5; 5; 5; 5 MG/1; MG/1; MG/1; MG/1
30 TABLET ORAL DAILY
COMMUNITY
End: 2023-01-19

## 2022-11-30 RX ORDER — MORPHINE SULFATE 2 MG/ML
1 INJECTION, SOLUTION INTRAMUSCULAR; INTRAVENOUS EVERY 4 HOURS PRN
Status: DISCONTINUED | OUTPATIENT
Start: 2022-11-30 | End: 2022-12-03 | Stop reason: HOSPADM

## 2022-11-30 RX ORDER — SODIUM CHLORIDE 0.9 % (FLUSH) 0.9 %
10 SYRINGE (ML) INJECTION AS NEEDED
Status: DISCONTINUED | OUTPATIENT
Start: 2022-11-30 | End: 2022-12-03 | Stop reason: HOSPADM

## 2022-11-30 RX ORDER — ARIPIPRAZOLE 10 MG/1
10 TABLET ORAL DAILY
Status: DISCONTINUED | OUTPATIENT
Start: 2022-11-30 | End: 2022-11-30

## 2022-11-30 RX ORDER — TRAZODONE HYDROCHLORIDE 50 MG/1
50 TABLET ORAL NIGHTLY
Status: DISCONTINUED | OUTPATIENT
Start: 2022-11-30 | End: 2022-12-03 | Stop reason: HOSPADM

## 2022-11-30 RX ORDER — TRAZODONE HYDROCHLORIDE 50 MG/1
50 TABLET ORAL NIGHTLY
COMMUNITY

## 2022-11-30 RX ORDER — BUPROPION HYDROCHLORIDE 150 MG/1
150 TABLET, EXTENDED RELEASE ORAL 2 TIMES DAILY
COMMUNITY

## 2022-11-30 RX ORDER — CLINDAMYCIN HYDROCHLORIDE 300 MG/1
300 CAPSULE ORAL 3 TIMES DAILY
COMMUNITY
End: 2022-12-03 | Stop reason: HOSPADM

## 2022-11-30 RX ORDER — SODIUM CHLORIDE 9 MG/ML
40 INJECTION, SOLUTION INTRAVENOUS AS NEEDED
Status: DISCONTINUED | OUTPATIENT
Start: 2022-11-30 | End: 2022-12-03 | Stop reason: HOSPADM

## 2022-11-30 RX ADMIN — SODIUM CHLORIDE 1000 ML: 9 INJECTION, SOLUTION INTRAVENOUS at 11:42

## 2022-11-30 RX ADMIN — TAZOBACTAM SODIUM AND PIPERACILLIN SODIUM 3.38 G: 375; 3 INJECTION, SOLUTION INTRAVENOUS at 14:11

## 2022-11-30 RX ADMIN — LAMOTRIGINE 100 MG: 100 TABLET ORAL at 21:21

## 2022-11-30 RX ADMIN — TRAZODONE HYDROCHLORIDE 50 MG: 50 TABLET ORAL at 21:10

## 2022-11-30 RX ADMIN — SODIUM CHLORIDE 3 G: 900 INJECTION INTRAVENOUS at 21:09

## 2022-11-30 RX ADMIN — HYDROCODONE BITARTRATE AND ACETAMINOPHEN 10 ML: 7.5; 325 SOLUTION ORAL at 15:13

## 2022-11-30 RX ADMIN — Medication 10 ML: at 21:11

## 2022-11-30 RX ADMIN — BUPROPION HYDROCHLORIDE 150 MG: 150 TABLET, EXTENDED RELEASE ORAL at 21:10

## 2022-11-30 RX ADMIN — HYDROCODONE BITARTRATE AND ACETAMINOPHEN 10 ML: 7.5; 325 SOLUTION ORAL at 21:10

## 2022-11-30 RX ADMIN — VANCOMYCIN HYDROCHLORIDE 2000 MG: 10 INJECTION, POWDER, LYOPHILIZED, FOR SOLUTION INTRAVENOUS at 16:53

## 2022-11-30 RX ADMIN — IOPAMIDOL 75 ML: 612 INJECTION, SOLUTION INTRAVENOUS at 12:01

## 2022-11-30 RX ADMIN — ARIPIPRAZOLE 10 MG: 10 TABLET ORAL at 21:10

## 2022-12-01 PROCEDURE — 25010000002 AMPICILLIN-SULBACTAM PER 1.5 G: Performed by: INTERNAL MEDICINE

## 2022-12-01 PROCEDURE — 96372 THER/PROPH/DIAG INJ SC/IM: CPT

## 2022-12-01 PROCEDURE — 96366 THER/PROPH/DIAG IV INF ADDON: CPT

## 2022-12-01 PROCEDURE — 25010000002 ENOXAPARIN PER 10 MG: Performed by: INTERNAL MEDICINE

## 2022-12-01 PROCEDURE — 99225 PR SBSQ OBSERVATION CARE/DAY 25 MINUTES: CPT | Performed by: HOSPITALIST

## 2022-12-01 PROCEDURE — G0378 HOSPITAL OBSERVATION PER HR: HCPCS

## 2022-12-01 RX ADMIN — BUPROPION HYDROCHLORIDE 150 MG: 150 TABLET, EXTENDED RELEASE ORAL at 22:28

## 2022-12-01 RX ADMIN — Medication 10 ML: at 08:50

## 2022-12-01 RX ADMIN — TRAZODONE HYDROCHLORIDE 50 MG: 50 TABLET ORAL at 22:28

## 2022-12-01 RX ADMIN — ARIPIPRAZOLE 10 MG: 10 TABLET ORAL at 22:28

## 2022-12-01 RX ADMIN — LAMOTRIGINE 100 MG: 100 TABLET ORAL at 22:28

## 2022-12-01 RX ADMIN — ENOXAPARIN SODIUM 40 MG: 40 INJECTION SUBCUTANEOUS at 08:49

## 2022-12-01 RX ADMIN — SODIUM CHLORIDE 3 G: 900 INJECTION INTRAVENOUS at 15:25

## 2022-12-01 RX ADMIN — SODIUM CHLORIDE 3 G: 900 INJECTION INTRAVENOUS at 08:49

## 2022-12-01 RX ADMIN — SODIUM CHLORIDE 3 G: 900 INJECTION INTRAVENOUS at 03:41

## 2022-12-01 RX ADMIN — BUPROPION HYDROCHLORIDE 150 MG: 150 TABLET, EXTENDED RELEASE ORAL at 08:49

## 2022-12-01 RX ADMIN — LEVOTHYROXINE SODIUM 88 MCG: 88 TABLET ORAL at 08:50

## 2022-12-01 RX ADMIN — Medication 10 ML: at 22:29

## 2022-12-01 RX ADMIN — SODIUM CHLORIDE 3 G: 900 INJECTION INTRAVENOUS at 22:27

## 2022-12-01 RX ADMIN — HYDROCODONE BITARTRATE AND ACETAMINOPHEN 10 ML: 7.5; 325 SOLUTION ORAL at 22:27

## 2022-12-01 RX ADMIN — HYDROCODONE BITARTRATE AND ACETAMINOPHEN 10 ML: 7.5; 325 SOLUTION ORAL at 08:50

## 2022-12-01 NOTE — PROGRESS NOTES
ENT Progress Note      Subjective    Her pain is slowly improving as is her trismus.  She is tolerating an oral diet      Review of Systems:    All systems were reviewed and negative.     Objective     Vital Signs  Temp:  [97.6 °F (36.4 °C)-99.2 °F (37.3 °C)] 97.8 °F (36.6 °C)  Heart Rate:  [64-78] 78  Resp:  [16-18] 18  BP: (111-131)/(62-85) 131/62    Physical Exam:  General Appearance:    Alert, cooperative, in no acute distress, no audible stridor   Head:    Normocephalic, without obvious abnormality, atraumatic   Eyes:          conjunctivae and sclerae normal, corneas clear, PERRLA   Ears:   Ear canals clear, right TM normal, left TM normal   Oral Exam:  Left tonsil and peritonsillar space edema and inflammation are improving.  No palpable fluctuance.  No midline shift.  Mild trismus still present   Neck:   No adenopathy, supple, trachea midline, no thyromegaly   Salivary Glands:     No palpable masses   Lungs:     Clear to auscultation    Heart:    Regular rhythm and normal rate   Neurologic:   Cranial nerves II-XII grossly intact, no spontaneous   nystagmus        Results Review:    Results from last 7 days   Lab Units 11/30/22  1140   WBC 10*3/mm3 7.48   HEMOGLOBIN g/dL 12.6   HEMATOCRIT % 38.2   PLATELETS 10*3/mm3 213     Results from last 7 days   Lab Units 11/30/22  1140   SODIUM mmol/L 138   POTASSIUM mmol/L 4.1   CHLORIDE mmol/L 101   CO2 mmol/L 27.0   BUN mg/dL 11   CREATININE mg/dL 0.84   GLUCOSE mg/dL 92   CALCIUM mg/dL 9.0     Imaging Results (Last 24 Hours)     ** No results found for the last 24 hours. **        No results found for: ACANTHNAEG, AFBCX, BPERTUSSISCX, BLOODCX  No results found for: BCIDPCR, CXREFLEX, CSFCX, CULTURETIS  No results found for: CULTURES, HSVCX, URCX  No results found for: EYECULTURE, GCCX, HSVCULTURE, LABHSV  No results found for: LEGIONELLA, MRSACX, MUMPSCX, MYCOPLASCX  No results found for: NOCARDIACX, STOOLCX  No results found for: THROATCX, UNSTIMCULT, URINECX,  CULTURE, VZVCULTUR  No results found for: VIRALCULTU, WOUNDCX    Assessment & Plan    Improving left peritonsillar cellulitis and small abscess.  I recommend continued IV antibiotics for another 24 hours and I am optimistic that she will be able to go home tomorrow.  No indication for surgical intervention at this time.      Kelby Contreras MD  12/01/22  13:10 EST

## 2022-12-01 NOTE — PLAN OF CARE
Problem: Adult Inpatient Plan of Care  Goal: Plan of Care Review  Outcome: Ongoing, Progressing  Flowsheets (Taken 12/1/2022 0533)  Progress: improving  Outcome Evaluation: pt did well during shift, vss, pain medication given once at beginning of shift.  Goal: Patient-Specific Goal (Individualized)  Outcome: Ongoing, Progressing  Goal: Absence of Hospital-Acquired Illness or Injury  Outcome: Ongoing, Progressing  Intervention: Identify and Manage Fall Risk  Recent Flowsheet Documentation  Taken 12/1/2022 0400 by Andra Henry, RN  Safety Promotion/Fall Prevention:   toileting scheduled   safety round/check completed   room organization consistent   nonskid shoes/slippers when out of bed   lighting adjusted   fall prevention program maintained   clutter free environment maintained   activity supervised  Taken 12/1/2022 0200 by Andra Henry, RN  Safety Promotion/Fall Prevention:   toileting scheduled   safety round/check completed   room organization consistent   nonskid shoes/slippers when out of bed   mobility aid in reach   lighting adjusted   fall prevention program maintained   clutter free environment maintained   activity supervised  Taken 11/30/2022 2200 by Andra Henry, RN  Safety Promotion/Fall Prevention:   toileting scheduled   room organization consistent   safety round/check completed   nonskid shoes/slippers when out of bed   lighting adjusted   fall prevention program maintained   clutter free environment maintained   activity supervised  Taken 11/30/2022 2000 by Andra Henry, RN  Safety Promotion/Fall Prevention:   toileting scheduled   safety round/check completed   room organization consistent   nonskid shoes/slippers when out of bed   lighting adjusted   fall prevention program maintained   clutter free environment maintained   activity supervised  Intervention: Prevent Skin Injury  Recent Flowsheet Documentation  Taken 12/1/2022 0400 by Andra Henry, RN  Body Position:  position changed independently  Taken 12/1/2022 0200 by Andra Henry RN  Body Position: position changed independently  Taken 12/1/2022 0000 by Andra Henry RN  Body Position: position changed independently  Taken 11/30/2022 2200 by Andra Henry RN  Body Position: position changed independently  Taken 11/30/2022 2000 by Andra Henry RN  Body Position: position changed independently  Skin Protection:   adhesive use limited   tubing/devices free from skin contact  Intervention: Prevent and Manage VTE (Venous Thromboembolism) Risk  Recent Flowsheet Documentation  Taken 12/1/2022 0400 by Andra Henry RN  Activity Management: activity adjusted per tolerance  Taken 12/1/2022 0200 by Andra Henry RN  Activity Management: activity adjusted per tolerance  Taken 12/1/2022 0000 by Andra Henry RN  Activity Management: activity adjusted per tolerance  Taken 11/30/2022 2200 by Andra Henry RN  Activity Management: activity adjusted per tolerance  Taken 11/30/2022 2000 by Andra Henry RN  Activity Management: activity adjusted per tolerance  VTE Prevention/Management: (pt ambulating) --  Range of Motion: active ROM (range of motion) encouraged  Intervention: Prevent Infection  Recent Flowsheet Documentation  Taken 12/1/2022 0400 by Andra Henry RN  Infection Prevention:   single patient room provided   rest/sleep promoted  Taken 12/1/2022 0200 by Andra Henry RN  Infection Prevention:   single patient room provided   rest/sleep promoted  Goal: Optimal Comfort and Wellbeing  Outcome: Ongoing, Progressing  Intervention: Provide Person-Centered Care  Recent Flowsheet Documentation  Taken 11/30/2022 2000 by Andra Henry RN  Trust Relationship/Rapport:   care explained   questions answered   questions encouraged  Goal: Readiness for Transition of Care  Outcome: Ongoing, Progressing     Problem: Infection  Goal: Absence of Infection Signs and Symptoms  Outcome:  Ongoing, Progressing   Goal Outcome Evaluation:           Progress: improving  Outcome Evaluation: pt did well during shift, vss, pain medication given once at beginning of shift.

## 2022-12-02 LAB — BACTERIA SPEC AEROBE CULT: NORMAL

## 2022-12-02 PROCEDURE — 96372 THER/PROPH/DIAG INJ SC/IM: CPT

## 2022-12-02 PROCEDURE — 25010000002 ENOXAPARIN PER 10 MG: Performed by: INTERNAL MEDICINE

## 2022-12-02 PROCEDURE — 99225 PR SBSQ OBSERVATION CARE/DAY 25 MINUTES: CPT | Performed by: HOSPITALIST

## 2022-12-02 PROCEDURE — 25010000002 KETOROLAC TROMETHAMINE PER 15 MG: Performed by: INTERNAL MEDICINE

## 2022-12-02 PROCEDURE — G0378 HOSPITAL OBSERVATION PER HR: HCPCS

## 2022-12-02 PROCEDURE — 96375 TX/PRO/DX INJ NEW DRUG ADDON: CPT

## 2022-12-02 PROCEDURE — 25010000002 AMPICILLIN-SULBACTAM PER 1.5 G: Performed by: INTERNAL MEDICINE

## 2022-12-02 PROCEDURE — 96366 THER/PROPH/DIAG IV INF ADDON: CPT

## 2022-12-02 PROCEDURE — 25010000002 DIAZEPAM PER 5 MG: Performed by: HOSPITALIST

## 2022-12-02 RX ORDER — CYCLOBENZAPRINE HCL 10 MG
10 TABLET ORAL EVERY 8 HOURS SCHEDULED
Status: DISCONTINUED | OUTPATIENT
Start: 2022-12-02 | End: 2022-12-03 | Stop reason: HOSPADM

## 2022-12-02 RX ORDER — DIAZEPAM 5 MG/ML
2.5 INJECTION, SOLUTION INTRAMUSCULAR; INTRAVENOUS ONCE
Status: COMPLETED | OUTPATIENT
Start: 2022-12-02 | End: 2022-12-02

## 2022-12-02 RX ADMIN — DIAZEPAM 2.5 MG: 10 INJECTION, SOLUTION INTRAMUSCULAR; INTRAVENOUS at 14:46

## 2022-12-02 RX ADMIN — ENOXAPARIN SODIUM 40 MG: 40 INJECTION SUBCUTANEOUS at 09:07

## 2022-12-02 RX ADMIN — BUPROPION HYDROCHLORIDE 150 MG: 150 TABLET, EXTENDED RELEASE ORAL at 21:13

## 2022-12-02 RX ADMIN — LAMOTRIGINE 100 MG: 100 TABLET ORAL at 21:12

## 2022-12-02 RX ADMIN — LEVOTHYROXINE SODIUM 88 MCG: 88 TABLET ORAL at 09:07

## 2022-12-02 RX ADMIN — TRAZODONE HYDROCHLORIDE 50 MG: 50 TABLET ORAL at 21:12

## 2022-12-02 RX ADMIN — SODIUM CHLORIDE 3 G: 900 INJECTION INTRAVENOUS at 21:13

## 2022-12-02 RX ADMIN — CYCLOBENZAPRINE 10 MG: 10 TABLET, FILM COATED ORAL at 14:45

## 2022-12-02 RX ADMIN — Medication 10 ML: at 09:08

## 2022-12-02 RX ADMIN — HYDROCODONE BITARTRATE AND ACETAMINOPHEN 10 ML: 7.5; 325 SOLUTION ORAL at 21:19

## 2022-12-02 RX ADMIN — ARIPIPRAZOLE 10 MG: 10 TABLET ORAL at 21:13

## 2022-12-02 RX ADMIN — SODIUM CHLORIDE 3 G: 900 INJECTION INTRAVENOUS at 04:08

## 2022-12-02 RX ADMIN — HYDROCODONE BITARTRATE AND ACETAMINOPHEN 10 ML: 7.5; 325 SOLUTION ORAL at 09:06

## 2022-12-02 RX ADMIN — Medication 10 ML: at 21:13

## 2022-12-02 RX ADMIN — CYCLOBENZAPRINE 10 MG: 10 TABLET, FILM COATED ORAL at 21:12

## 2022-12-02 RX ADMIN — KETOROLAC TROMETHAMINE 30 MG: 30 INJECTION, SOLUTION INTRAMUSCULAR; INTRAVENOUS at 22:57

## 2022-12-02 RX ADMIN — ACETAMINOPHEN 650 MG: 325 TABLET ORAL at 13:06

## 2022-12-02 RX ADMIN — SODIUM CHLORIDE 3 G: 900 INJECTION INTRAVENOUS at 09:06

## 2022-12-02 RX ADMIN — BUPROPION HYDROCHLORIDE 150 MG: 150 TABLET, EXTENDED RELEASE ORAL at 09:07

## 2022-12-02 RX ADMIN — SODIUM CHLORIDE 3 G: 900 INJECTION INTRAVENOUS at 14:45

## 2022-12-02 NOTE — PROGRESS NOTES
[facility]  PROGRESS NOTE    Patient Name: Erika Alfonso  : 1970  MRN: [id number]    Date of Admission: 2022  Primary Care Physician: Sammy Crowley DO    Subjective   Subjective     CC:   Tonsillar Abscess    HPI:   Seen by ENT and planning on abx for now.  No fevers or chills on IV abx.  T Max ~ 99 F    ROS:    Gen- No fevers, chills  CV- No chest pain, palpitations  Resp- No cough, dyspnea  GI- No N/V/D, abd pain      Objective   Objective     Vital Signs:   Temp:  [97.6 °F (36.4 °C)-99 °F (37.2 °C)] 99 °F (37.2 °C)  Heart Rate:  [64-85] 85  Resp:  [16-18] 18  BP: (117-131)/(62-75) 124/65     Physical Exam:    Constitutional: No acute distress, awake, alert  HENT: NCAT, throat swelling  Respiratory: Clear to auscultation bilaterally, respiratory effort normal   Cardiovascular: RRR, S1 and S2  Gastrointestinal: Positive bowel sounds, soft, nontender, nondistended  Musculoskeletal: No bilateral ankle edema  Psychiatric: Appropriate affect, cooperative  Neurologic: Oriented x 3, strength symmetric in all extremities, Cranial Nerves grossly intact to confrontation, speech clear  Skin: No rashes    Results Reviewed:  LAB RESULTS:      Lab 22  1140   WBC 7.48   HEMOGLOBIN 12.6   HEMATOCRIT 38.2   PLATELETS 213   NEUTROS ABS 5.17   IMMATURE GRANS (ABS) 0.02   LYMPHS ABS 1.51   MONOS ABS 0.62   EOS ABS 0.13   MCV 87.4   LACTATE 0.6         Lab 22  1140   SODIUM 138   POTASSIUM 4.1   CHLORIDE 101   CO2 27.0   ANION GAP 10.0   BUN 11   CREATININE 0.84   EGFR 83.7   GLUCOSE 92   CALCIUM 9.0         Lab 22  1140   TOTAL PROTEIN 7.2   ALBUMIN 4.20   GLOBULIN 3.0   ALT (SGPT) 11   AST (SGOT) 10   BILIRUBIN 0.6   ALK PHOS 137*         Lab 22  1140   TROPONIN T <0.010                 Brief Urine Lab Results  (Last result in the past 365 days)      Color   Clarity   Blood   Leuk Est   Nitrite   Protein   CREAT   Urine HCG         11/30/22 1114 Yellow   Cloudy   Negative   Trace   Negative   Negative                 Microbiology Results Abnormal     Procedure Component Value - Date/Time    Beta Strep Culture, Throat - Swab, Throat [[id number]]  (Normal) Collected: 11/30/22 1654    Lab Status: Preliminary result Specimen: Swab from Throat Updated: 12/01/22 0729     Throat Culture, Beta Strep No Beta Hemolytic Streptococcus Isolated    Narrative:      Group A Strep incidence is low in adults. Positive culture for Beta hemolytic Streptococcus species can reflect colonization and not true infection. Please correlate clinically.    MRSA Screen, PCR (Inpatient) - Swab, Nares [[id number]]  (Normal) Collected: 11/30/22 1654    Lab Status: Final result Specimen: Swab from Nares Updated: 11/30/22 1817     MRSA PCR Negative    Narrative:      The negative predictive value of this diagnostic test is high and should only be used to consider de-escalating anti-MRSA therapy. A positive result may indicate colonization with MRSA and must be correlated clinically.  MRSA Negative    Rapid Strep A Screen - Swab, Throat [[id number]]  (Normal) Collected: 11/30/22 1654    Lab Status: Final result Specimen: Swab from Throat Updated: 11/30/22 1718     Strep A Ag Negative    Narrative:      Test performed by Direct Antigen Testing.    COVID PRE-OP / PRE-PROCEDURE SCREENING ORDER (NO ISOLATION) - Swab, Nasopharynx [[id number]]  (Normal) Collected: 11/30/22 1114    Lab Status: Final result Specimen: Swab from Nasopharynx Updated: 11/30/22 1158    Narrative:      The following orders were created for panel order COVID PRE-OP / PRE-PROCEDURE SCREENING ORDER (NO ISOLATION) - Swab, Nasopharynx.  Procedure                               Abnormality         Status                     ---------                               -----------         ------                     COVID-19 and FLU A/B PCR...[[id number]]  Normal              Final result                 Please view results for  these tests on the individual orders.    COVID-19 and FLU A/B PCR - Swab, Nasopharynx [[id number]]  (Normal) Collected: 11/30/22 1114    Lab Status: Final result Specimen: Swab from Nasopharynx Updated: 11/30/22 1158     COVID19 Not Detected     Influenza A PCR Not Detected     Influenza B PCR Not Detected    Narrative:      Fact sheet for providers: https://www.fda.gov/media/264857/download    Fact sheet for patients: https://www.fda.gov/media/898672/download    Test performed by PCR.          CT Soft Tissue Neck With Contrast    Result Date: 11/30/2022  DATE OF EXAM: 11/30/2022 11:58 AM  PROCEDURE: CT SOFT TISSUE NECK W CONTRAST-  INDICATIONS: left tonsil pain/swelling/abscess  COMPARISON: No comparisons available.  TECHNIQUE: After the intravenous administration of 75 mL of Isovue 300, contiguous axial images were obtained through the neck. Coronal and sagittal reconstructions were performed. Automated exposure control and iterative reconstruction methods were used.  The radiation dose reduction device was turned on for each scan per the ALARA (As Low as Reasonably Achievable) protocol.  FINDINGS: There is diffuse enlargement of the left palatine tonsil, with relatively subtle central abscess is seen on axial images, with maximal axial diameter of up to 12 mm. The sagittal reconstruction images, however, appear to show this extending over a significant length, proximal 4.5 cm, varying from 6 mm to 2.5 mm in AP diameter. Inflammation extends from near the left torus tubarius down to the level of the left piriform sinus, which is effaced. Please refer to axial images 33 through 53, and sagittal image 33. The adenoids appear spared. Lingual tonsils are not obviously involved. No extension of disease to the prevertebral soft tissues is appreciated.  Elsewhere, submandibular and parotid glands appear normal. Thyroid gland appears normal. No inflammatory fat stranding is seen. There are several enlarged anterior  triangle lymph nodes, up to 16 x 9 mm in maximal diameter. No gross abnormalities are seen of the orbits or included base of the brain. Included lung apices appear clear.        Impression:  Diffusely enlarged left palatine tonsil, with elongated abscess extending from the area near the torus tubarius, down to the level of the piriform sinus. Relatively mild reactive lymphadenopathy of left neck.  This report was finalized on 11/30/2022 12:43 PM by Dr. Neel Mccormick MD.      XR Chest 1 View    Result Date: 11/30/2022  DATE OF EXAM: 11/30/2022 10:52 AM  PROCEDURE: XR CHEST 1 VW-  INDICATIONS: cough/congestion  COMPARISON: 11/30/2022  TECHNIQUE: Single radiographic view of the chest was obtained.  FINDINGS: Evaluation of the bases is limited by overlying soft tissue. No focal pulmonary consolidations are evident. Pulmonary vascularity appears within normal limits. Heart size and mediastinal contour are within normal limits. No pneumothorax is seen.      Impression: No acute cardiopulmonary findings.  This report was finalized on 11/30/2022 11:11 AM by Vinny Patel MD.            I have reviewed the medications:  Scheduled Meds:ampicillin-sulbactam, 3 g, Intravenous, Q6H  ARIPiprazole, 10 mg, Oral, Nightly  buPROPion SR, 150 mg, Oral, BID  enoxaparin, 40 mg, Subcutaneous, Daily  lamoTRIgine, 100 mg, Oral, Nightly  levothyroxine, 88 mcg, Oral, Daily  sodium chloride, 10 mL, Intravenous, Q12H  traZODone, 50 mg, Oral, Nightly      Continuous Infusions:   PRN Meds:.•  acetaminophen  •  HYDROcodone-acetaminophen  •  ketorolac  •  Morphine  •  ondansetron **OR** ondansetron  •  [COMPLETED] Insert Peripheral IV **AND** sodium chloride  •  sodium chloride  •  sodium chloride    Assessment & Plan   Assessment & Plan     Active Hospital Problems    Diagnosis  POA   • **Tonsillar abscess [J36]  Yes   • Depression [F32.A]  Yes   • Anxiety [F41.9]  Yes   • Hypercholesteremia [E78.00]  Yes   • Obesity, Class II, BMI 35-39.9 [E66.9]   Yes      Resolved Hospital Problems   No resolved problems to display.        Brief Hospital Course to date:  Erika Alfonso is a 52 y.o. female with history of PCOS, obesity, depression, hyperlipidemia who presented to [facility] with throat pain and swelling found to have a tonsillar abscess    She was seen by ear, nose and throat and is on IV abx    She failed outpatient oral antibiotics and was started on IV Unasyn     Tonsillar Abscess   - CT with elongated abscess extending from the area near the torus tubarius, down to the level of the piriform sinus  - progressive worsening despite outpatient oral abx (clindamycin, CTX)  - Started on IV Unasyn  - ENT consulted, ER physician did speak with Dr. Contreras who recommended antibiotics, given that it is elongated on CT scan unlikely that it will be able to be drained any further, was partially drained yesterday by [place] [facility] office, unclear if culture was sent  -Seems to be improving     Depression  -Wellbutrin 150 oral twice per day  -Abilify 10 mg nightly     Obesity  -BMI greater than 30    Hypothyroidism  -Levothyroxine 88 mcg    Continue on IV Unasyn today  ENT optimistic for possible home tomorrow    Expected Discharge Location and Transportation:  home  Expected Discharge Date:  12/2    DVT prophylaxis:  Medical DVT prophylaxis orders are present.     AM-PAC 6 Clicks Score (PT): 24 (12/01/22 0800)    CODE STATUS:   Code Status and Medical Interventions:   Ordered at: 11/30/22 1329     Level Of Support Discussed With:    Patient     Code Status (Patient has no pulse and is not breathing):    CPR (Attempt to Resuscitate)     Medical Interventions (Patient has pulse or is breathing):    Full Support       Wisam Hardin MD  12/01/22

## 2022-12-02 NOTE — PROGRESS NOTES
ENT Progress Note      Subjective Throat pain and dysphagia are gradually improving.  Trismus is gradually improving.  Earlier today, she had fever and chills that have resolved.      Review of Systems:    All systems were reviewed and negative.     Objective     Vital Signs  Temp:  [98.7 °F (37.1 °C)-99.9 °F (37.7 °C)] 99.9 °F (37.7 °C)  Heart Rate:  [69-80] 80  Resp:  [16-18] 18  BP: (104-133)/(59-74) 104/74    Physical Exam:  General Appearance:    Alert, cooperative, in no acute distress, no audible stridor   Head:    Normocephalic, without obvious abnormality, atraumatic   Eyes:          conjunctivae and sclerae normal, corneas clear, PERRLA   Ears:   Ear canals clear, right TM normal, left TM normal   Oral Exam:  No trismus.  No evidence of residual peritonsillar cellulitis or abscess on the left.  Tonsils are now symmetric and normal in appearance.   Neck:  Tender adenopathy left neck   Salivary Glands:     No palpable masses   Lungs:     Clear to auscultation    Heart:    Regular rhythm and normal rate   Neurologic:   Cranial nerves II-XII grossly intact, no spontaneous   nystagmus        Results Review:    Results from last 7 days   Lab Units 11/30/22  1140   WBC 10*3/mm3 7.48   HEMOGLOBIN g/dL 12.6   HEMATOCRIT % 38.2   PLATELETS 10*3/mm3 213     Results from last 7 days   Lab Units 11/30/22  1140   SODIUM mmol/L 138   POTASSIUM mmol/L 4.1   CHLORIDE mmol/L 101   CO2 mmol/L 27.0   BUN mg/dL 11   CREATININE mg/dL 0.84   GLUCOSE mg/dL 92   CALCIUM mg/dL 9.0     Imaging Results (Last 24 Hours)     ** No results found for the last 24 hours. **        Blood Culture   Date Value Ref Range Status   11/30/2022 No growth at 24 hours  Preliminary     No results found for: BCIDPCR, CXREFLEX, CSFCX, CULTURETIS  No results found for: CULTURES, HSVCX, URCX  No results found for: EYECULTURE, GCCX, HSVCULTURE, LABHSV  No results found for: LEGIONELLA, MRSACX, MUMPSCX, MYCOPLASCX  No results found for: NOCARDIACX,  STOOLCX  No results found for: THROATCX, UNSTIMCULT, URINECX, CULTURE, VZVCULTUR  No results found for: VIRALCULTU, WOUNDCX    Assessment & Plan Peritonsillar abscess and cellulitis appear to be resolved.  She has residual associated lymphadenitis and given her febrile episode earlier today, I would recommend IV antibiotics for another 24 hours.      Kelby Contreras MD  12/02/22  17:29 EST

## 2022-12-02 NOTE — PROGRESS NOTES
[facility] Medicine Services  PROGRESS NOTE    Patient Name: Erika Alfonso  : 1970  MRN: [id number]    Date of Admission: 2022  Primary Care Physician: Sammy Crowley DO    Subjective   Subjective     CC:   Tonsillar Abscess    HPI:    Says she is worried about fever on abx.  Worried about going home and not being better.   Neck pain reported today.   in room today.  Says she can't swallow pills well.  Advanced diet today.  Discussed medication for anxiety and muscle relaxation    ROS:    Gen- denies chills, no fevers  CV- No chest pain, palpitations  Resp- No cough, dyspnea  GI- No N/V/D, abd pain      Objective   Objective     Vital Signs:   Temp:  [98.7 °F (37.1 °C)-99.9 °F (37.7 °C)] 99.9 °F (37.7 °C)  Heart Rate:  [69-80] 80  Resp:  [16-18] 18  BP: (104-133)/(59-74) 104/74     Physical Exam:    Constitutional: awake, no acute distress  HENT: NCAT, throat swelling  Respiratory: Clear to auscultation bilaterally, respiratory effort normal   Cardiovascular: RRR, S1 and S2  Gastrointestinal: Positive bowel sounds, soft, nontender, nondistended  Musculoskeletal: No bilateral ankle edema  Psychiatric: Appropriate affect, cooperative  Neurologic: Oriented x 3, strength symmetric in all extremities, Cranial Nerves grossly intact to confrontation, speech clear  Skin: No rashes    Results Reviewed:  LAB RESULTS:      Lab 22  1140   WBC 7.48   HEMOGLOBIN 12.6   HEMATOCRIT 38.2   PLATELETS 213   NEUTROS ABS 5.17   IMMATURE GRANS (ABS) 0.02   LYMPHS ABS 1.51   MONOS ABS 0.62   EOS ABS 0.13   MCV 87.4   LACTATE 0.6         Lab 22  1140   SODIUM 138   POTASSIUM 4.1   CHLORIDE 101   CO2 27.0   ANION GAP 10.0   BUN 11   CREATININE 0.84   EGFR 83.7   GLUCOSE 92   CALCIUM 9.0         Lab 22  1140   TOTAL PROTEIN 7.2   ALBUMIN 4.20   GLOBULIN 3.0   ALT (SGPT) 11   AST (SGOT) 10   BILIRUBIN 0.6   ALK PHOS 137*         Lab 22  1140   TROPONIN T  <0.010                 Brief Urine Lab Results  (Last result in the past 365 days)      Color   Clarity   Blood   Leuk Est   Nitrite   Protein   CREAT   Urine HCG        11/30/22 1114 Yellow   Cloudy   Negative   Trace   Negative   Negative                 Microbiology Results Abnormal     Procedure Component Value - Date/Time    Blood Culture - Blood, Arm, Left [[id number]]  (Normal) Collected: 11/30/22 1333    Lab Status: Preliminary result Specimen: Blood from Arm, Left Updated: 12/02/22 0800     Blood Culture No growth at 24 hours    Beta Strep Culture, Throat - Swab, Throat [[id number]]  (Normal) Collected: 11/30/22 1654    Lab Status: Final result Specimen: Swab from Throat Updated: 12/02/22 0730     Throat Culture, Beta Strep No Beta Hemolytic Streptococcus Isolated    Narrative:      Group A Strep incidence is low in adults. Positive culture for Beta hemolytic Streptococcus species can reflect colonization and not true infection. Please correlate clinically.      Blood Culture - Blood, Arm, Left [[id number]]  (Normal) Collected: 11/30/22 1038    Lab Status: Preliminary result Specimen: Blood from Arm, Left Updated: 12/01/22 2131     Blood Culture No growth at 24 hours    MRSA Screen, PCR (Inpatient) - Swab, Nares [[id number]]  (Normal) Collected: 11/30/22 1654    Lab Status: Final result Specimen: Swab from Nares Updated: 11/30/22 1817     MRSA PCR Negative    Narrative:      The negative predictive value of this diagnostic test is high and should only be used to consider de-escalating anti-MRSA therapy. A positive result may indicate colonization with MRSA and must be correlated clinically.  MRSA Negative    Rapid Strep A Screen - Swab, Throat [[id number]]  (Normal) Collected: 11/30/22 1654    Lab Status: Final result Specimen: Swab from Throat Updated: 11/30/22 1718     Strep A Ag Negative    Narrative:      Test performed by Direct Antigen Testing.    COVID PRE-OP / PRE-PROCEDURE SCREENING ORDER (NO ISOLATION)  - Swab, Nasopharynx [[id number]]  (Normal) Collected: 11/30/22 1114    Lab Status: Final result Specimen: Swab from Nasopharynx Updated: 11/30/22 1158    Narrative:      The following orders were created for panel order COVID PRE-OP / PRE-PROCEDURE SCREENING ORDER (NO ISOLATION) - Swab, Nasopharynx.  Procedure                               Abnormality         Status                     ---------                               -----------         ------                     COVID-19 and FLU A/B PCR...[[id number]]  Normal              Final result                 Please view results for these tests on the individual orders.    COVID-19 and FLU A/B PCR - Swab, Nasopharynx [[id number]]  (Normal) Collected: 11/30/22 1114    Lab Status: Final result Specimen: Swab from Nasopharynx Updated: 11/30/22 1158     COVID19 Not Detected     Influenza A PCR Not Detected     Influenza B PCR Not Detected    Narrative:      Fact sheet for providers: https://www.fda.gov/media/520620/download    Fact sheet for patients: https://www.fda.gov/media/049098/download    Test performed by PCR.          No radiology results from the last 24 hrs        I have reviewed the medications:  Scheduled Meds:ampicillin-sulbactam, 3 g, Intravenous, Q6H  ARIPiprazole, 10 mg, Oral, Nightly  buPROPion SR, 150 mg, Oral, BID  cyclobenzaprine, 10 mg, Oral, Q8H  enoxaparin, 40 mg, Subcutaneous, Daily  lamoTRIgine, 100 mg, Oral, Nightly  levothyroxine, 88 mcg, Oral, Daily  sodium chloride, 10 mL, Intravenous, Q12H  traZODone, 50 mg, Oral, Nightly      Continuous Infusions:   PRN Meds:.•  acetaminophen  •  HYDROcodone-acetaminophen  •  ketorolac  •  Morphine  •  ondansetron **OR** ondansetron  •  [COMPLETED] Insert Peripheral IV **AND** sodium chloride  •  sodium chloride  •  sodium chloride    Assessment & Plan   Assessment & Plan     Active Hospital Problems    Diagnosis  POA   • **Tonsillar abscess [J36]  Yes   • Depression [F32.A]  Yes   • Anxiety [F41.9]  Yes   •  Hypercholesteremia [E78.00]  Yes   • Obesity, Class II, BMI 35-39.9 [E66.9]  Yes      Resolved Hospital Problems   No resolved problems to display.        Brief Hospital Course to date:  Erika Alfonso is a 52 y.o. female with history of PCOS, obesity, depression, hyperlipidemia who presented to [facility] with throat pain and swelling found to have a tonsillar abscess    She was seen by ear, nose and throat and is on IV abx    She failed outpatient oral antibiotics and was started on IV Unasyn    She seems to want more time on IV abx while here in the hospital     Tonsillar Abscess   - CT with elongated abscess extending from the area near the torus tubarius, down to the level of the piriform sinus  - progressive worsening despite outpatient oral abx (clindamycin, CTX)  - Started on IV Unasyn  - ENT consulted, ER physician did speak with Dr. Contreras who recommended antibiotics, given that it is elongated on CT scan unlikely that it will be able to be drained any further, was partially drained yesterday by [place] [facility] office, unclear if culture was sent  - seems like she would like more time on IV abx since there was oral abx failure it sounds like     Depression  -Wellbutrin 150 oral twice per day  -Abilify 10 mg nightly     Obesity  -BMI greater than 30    Hypothyroidism  -Levothyroxine 88 mcg    Continue on IV Unasyn today    Trial of Valium / flexeril    Expected Discharge Location and Transportation:  home  Expected Discharge Date:  12/3 (Saturday)    DVT prophylaxis:  Medical DVT prophylaxis orders are present.     AM-PAC 6 Clicks Score (PT): 24 (12/02/22 0800)    CODE STATUS:   Code Status and Medical Interventions:   Ordered at: 11/30/22 1329     Level Of Support Discussed With:    Patient     Code Status (Patient has no pulse and is not breathing):    CPR (Attempt to Resuscitate)     Medical Interventions (Patient has pulse or is breathing):    Full Support       Wisam Hardin  MD  12/02/22

## 2022-12-03 VITALS
TEMPERATURE: 98 F | DIASTOLIC BLOOD PRESSURE: 68 MMHG | RESPIRATION RATE: 16 BRPM | BODY MASS INDEX: 37.73 KG/M2 | WEIGHT: 234.8 LBS | HEART RATE: 69 BPM | SYSTOLIC BLOOD PRESSURE: 119 MMHG | OXYGEN SATURATION: 96 % | HEIGHT: 66 IN

## 2022-12-03 LAB
QT INTERVAL: 406 MS
QTC INTERVAL: 435 MS

## 2022-12-03 PROCEDURE — 25010000002 AMPICILLIN-SULBACTAM PER 1.5 G: Performed by: INTERNAL MEDICINE

## 2022-12-03 PROCEDURE — G0378 HOSPITAL OBSERVATION PER HR: HCPCS

## 2022-12-03 PROCEDURE — 96372 THER/PROPH/DIAG INJ SC/IM: CPT

## 2022-12-03 PROCEDURE — 99217 PR OBSERVATION CARE DISCHARGE MANAGEMENT: CPT | Performed by: HOSPITALIST

## 2022-12-03 PROCEDURE — 25010000002 ENOXAPARIN PER 10 MG: Performed by: INTERNAL MEDICINE

## 2022-12-03 RX ORDER — AMOXICILLIN AND CLAVULANATE POTASSIUM 875; 125 MG/1; MG/1
1 TABLET, FILM COATED ORAL 2 TIMES DAILY
Qty: 6 TABLET | Refills: 0 | Status: SHIPPED | OUTPATIENT
Start: 2022-12-03 | End: 2022-12-06

## 2022-12-03 RX ORDER — CYCLOBENZAPRINE HCL 10 MG
10 TABLET ORAL EVERY 8 HOURS SCHEDULED
Qty: 42 TABLET | Refills: 0 | Status: SHIPPED | OUTPATIENT
Start: 2022-12-03 | End: 2022-12-17

## 2022-12-03 RX ADMIN — Medication 10 ML: at 08:24

## 2022-12-03 RX ADMIN — ENOXAPARIN SODIUM 40 MG: 40 INJECTION SUBCUTANEOUS at 08:26

## 2022-12-03 RX ADMIN — LEVOTHYROXINE SODIUM 88 MCG: 88 TABLET ORAL at 08:26

## 2022-12-03 RX ADMIN — BUPROPION HYDROCHLORIDE 150 MG: 150 TABLET, EXTENDED RELEASE ORAL at 08:26

## 2022-12-03 RX ADMIN — CYCLOBENZAPRINE 10 MG: 10 TABLET, FILM COATED ORAL at 05:51

## 2022-12-03 RX ADMIN — SODIUM CHLORIDE 3 G: 900 INJECTION INTRAVENOUS at 10:30

## 2022-12-03 RX ADMIN — SODIUM CHLORIDE 3 G: 900 INJECTION INTRAVENOUS at 04:19

## 2022-12-03 NOTE — PLAN OF CARE
Goal Outcome Evaluation:      Pt admitted for a tonisllar abscess needing IV antibiotic therapy. PT also c/o headache and neck discomfort releived with flexeril, toradol and IV vlaium x 1 yesterday.  PT states she is feeling much better and is ready to go home. She will f/u with Dr Contreras in three weeks and  scripts for augmentin and flexeril from her pharmacy. Pt discharged to home with her  in a private vehicle.

## 2022-12-03 NOTE — CASE MANAGEMENT/SOCIAL WORK
Case Management Discharge Note      Final Note: Patient discharged home today.         Selected Continued Care - Discharged on 12/3/2022 Admission date: 11/30/2022 - Discharge disposition: Home or Self Care    Destination    No services have been selected for the patient.              Durable Medical Equipment    No services have been selected for the patient.              Dialysis/Infusion    No services have been selected for the patient.              Home Medical Care    No services have been selected for the patient.              Therapy    No services have been selected for the patient.              Community Resources    No services have been selected for the patient.              Community & DME    No services have been selected for the patient.                       Final Discharge Disposition Code: 01 - home or self-care

## 2022-12-03 NOTE — PLAN OF CARE
Goal Outcome Evaluation:  Patient as c/o headache this shift, PRN pain medication administered with relief.     Antibiotics being administered as ordered for tonsillar abscess.    Sinus rhythm per telemetry.

## 2022-12-03 NOTE — DISCHARGE SUMMARY
[facility] Medicine Services  DISCHARGE SUMMARY    Patient Name: Erika Alfonso  : 1970  MRN: [id number]    Date of Admission: 2022  9:16 AM  Date of Discharge:  12/3/2022 (Saturday)  Primary Care Physician: Sammy Crowley DO    Consults     Date and Time Order Name Status Description    2022  2:09 PM Inpatient ENT Consult Completed         Kelby Contreras MD - Ear, Nose, and Throat    Hospital Course     Presenting Problem:   Tonsillar abscess [J36]    Active Hospital Problems    Diagnosis  POA   • **Tonsillar abscess [J36]  Yes   • Depression [F32.A]  Yes   • Anxiety [F41.9]  Yes   • Hypercholesteremia [E78.00]  Yes   • Obesity, Class II, BMI 35-39.9 [E66.9]  Yes      Resolved Hospital Problems   No resolved problems to display.      Hospital Course:  Erika Alfonso is a 52 y.o. female with a past medical history of PCOS, obesity, depression, hyperlipidemia who presented to [facility] with throat pain and swelling found to have a tonsillar abscess     She was seen by ear, nose and throat and is on IV abx     She failed outpatient oral antibiotics and was started on IV Unasyn     She seems to want more time on IV abx while here in the hospital     Tonsillar Abscess   - CT with elongated abscess extending from the area near the torus tubarius, down to the level of the piriform sinus  - progressive worsening despite outpatient oral abx (clindamycin, CTX)  - Started on IV Unasyn  - ENT consulted, ER physician did speak with Dr. Contreras who recommended antibiotics, given that it is elongated on CT scan unlikely that it will be able to be drained any further, was partially drained yesterday by [facility] office, unclear if culture was sent  - seems like she would like more time on IV abx while here since there was oral abx failure it sounds like     Depression  - Wellbutrin 150 oral twice per day  - Abilify 10 mg nightly     Obesity  - BMI  greater than 30     Hypothyroidism  - Synthroid 88 mcg daily     Continue on IV Unasyn while inpatient and transition to oral abx at discharge    Day of Discharge     HPI:    Asking to go home today.   in room    Review of Systems    Gen- No fevers, chills  CV- No chest pain, palpitations  Resp- No cough, dyspnea  GI- No N/V/D, abd pain    Vital Signs:   Temp:  [98 °F (36.7 °C)-99.9 °F (37.7 °C)] 98 °F (36.7 °C)  Heart Rate:  [62-90] 69  Resp:  [16-18] 16  BP: ()/(45-74) 119/68    Physical Exam:    Constitutional: No acute distress, awake, alert  HENT: NCAT, mucous membranes moist  Respiratory: Clear to auscultation bilaterally, respiratory effort normal   Cardiovascular: RRR, s1 and s2  Gastrointestinal: Positive bowel sounds, soft, nontender, nondistended  Musculoskeletal: No bilateral ankle edema  Psychiatric: Appropriate affect, cooperative  Skin: No rashes    Pertinent  and/or Most Recent Results     LAB RESULTS:      Lab 11/30/22  1140   WBC 7.48   HEMOGLOBIN 12.6   HEMATOCRIT 38.2   PLATELETS 213   NEUTROS ABS 5.17   IMMATURE GRANS (ABS) 0.02   LYMPHS ABS 1.51   MONOS ABS 0.62   EOS ABS 0.13   MCV 87.4   LACTATE 0.6         Lab 11/30/22  1140   SODIUM 138   POTASSIUM 4.1   CHLORIDE 101   CO2 27.0   ANION GAP 10.0   BUN 11   CREATININE 0.84   EGFR 83.7   GLUCOSE 92   CALCIUM 9.0         Lab 11/30/22  1140   TOTAL PROTEIN 7.2   ALBUMIN 4.20   GLOBULIN 3.0   ALT (SGPT) 11   AST (SGOT) 10   BILIRUBIN 0.6   ALK PHOS 137*         Lab 11/30/22  1140   TROPONIN T <0.010         Brief Urine Lab Results  (Last result in the past 365 days)      Color   Clarity   Blood   Leuk Est   Nitrite   Protein   CREAT   Urine HCG        11/30/22 1114 Yellow   Cloudy   Negative   Trace   Negative   Negative               Microbiology Results (last 10 days)     Procedure Component Value - Date/Time    Rapid Strep A Screen - Swab, Throat [[id number]]  (Normal) Collected: 11/30/22 1654    Lab Status: Final result Specimen:  Swab from Throat Updated: 11/30/22 1718     Strep A Ag Negative    Narrative:      Test performed by Direct Antigen Testing.    MRSA Screen, PCR (Inpatient) - Swab, Nares [[id number]]  (Normal) Collected: 11/30/22 1654    Lab Status: Final result Specimen: Swab from Nares Updated: 11/30/22 1817     MRSA PCR Negative    Narrative:      The negative predictive value of this diagnostic test is high and should only be used to consider de-escalating anti-MRSA therapy. A positive result may indicate colonization with MRSA and must be correlated clinically.  MRSA Negative    Beta Strep Culture, Throat - Swab, Throat [[id number]]  (Normal) Collected: 11/30/22 1654    Lab Status: Final result Specimen: Swab from Throat Updated: 12/02/22 0730     Throat Culture, Beta Strep No Beta Hemolytic Streptococcus Isolated    Narrative:      Group A Strep incidence is low in adults. Positive culture for Beta hemolytic Streptococcus species can reflect colonization and not true infection. Please correlate clinically.      Blood Culture - Blood, Arm, Left [[id number]]  (Normal) Collected: 11/30/22 1333    Lab Status: Preliminary result Specimen: Blood from Arm, Left Updated: 12/03/22 0800     Blood Culture No growth at 2 days    COVID PRE-OP / PRE-PROCEDURE SCREENING ORDER (NO ISOLATION) - Swab, Nasopharynx [[id number]]  (Normal) Collected: 11/30/22 1114    Lab Status: Final result Specimen: Swab from Nasopharynx Updated: 11/30/22 1158    Narrative:      The following orders were created for panel order COVID PRE-OP / PRE-PROCEDURE SCREENING ORDER (NO ISOLATION) - Swab, Nasopharynx.  Procedure                               Abnormality         Status                     ---------                               -----------         ------                     COVID-19 and FLU A/B PCR...[[id number]]  Normal              Final result                 Please view results for these tests on the individual orders.    COVID-19 and FLU A/B PCR - Swab,  Nasopharynx [[id number]]  (Normal) Collected: 11/30/22 1114    Lab Status: Final result Specimen: Swab from Nasopharynx Updated: 11/30/22 1158     COVID19 Not Detected     Influenza A PCR Not Detected     Influenza B PCR Not Detected    Narrative:      Fact sheet for providers: https://www.fda.gov/media/061407/download    Fact sheet for patients: https://www.fda.gov/media/401027/download    Test performed by PCR.    Blood Culture - Blood, Arm, Left [[id number]]  (Normal) Collected: 11/30/22 1038    Lab Status: Preliminary result Specimen: Blood from Arm, Left Updated: 12/02/22 2131     Blood Culture No growth at 2 days          CT Soft Tissue Neck With Contrast    Result Date: 11/30/2022  DATE OF EXAM: 11/30/2022 11:58 AM  PROCEDURE: CT SOFT TISSUE NECK W CONTRAST-  INDICATIONS: left tonsil pain/swelling/abscess  COMPARISON: No comparisons available.  TECHNIQUE: After the intravenous administration of 75 mL of Isovue 300, contiguous axial images were obtained through the neck. Coronal and sagittal reconstructions were performed. Automated exposure control and iterative reconstruction methods were used.  The radiation dose reduction device was turned on for each scan per the ALARA (As Low as Reasonably Achievable) protocol.  FINDINGS: There is diffuse enlargement of the left palatine tonsil, with relatively subtle central abscess is seen on axial images, with maximal axial diameter of up to 12 mm. The sagittal reconstruction images, however, appear to show this extending over a significant length, proximal 4.5 cm, varying from 6 mm to 2.5 mm in AP diameter. Inflammation extends from near the left torus tubarius down to the level of the left piriform sinus, which is effaced. Please refer to axial images 33 through 53, and sagittal image 33. The adenoids appear spared. Lingual tonsils are not obviously involved. No extension of disease to the prevertebral soft tissues is appreciated.  Elsewhere, submandibular and parotid  glands appear normal. Thyroid gland appears normal. No inflammatory fat stranding is seen. There are several enlarged anterior triangle lymph nodes, up to 16 x 9 mm in maximal diameter. No gross abnormalities are seen of the orbits or included base of the brain. Included lung apices appear clear.         Diffusely enlarged left palatine tonsil, with elongated abscess extending from the area near the torus tubarius, down to the level of the piriform sinus. Relatively mild reactive lymphadenopathy of left neck.  This report was finalized on 11/30/2022 12:43 PM by Dr. Neel Mccormick MD.      XR Chest 1 View    Result Date: 11/30/2022  DATE OF EXAM: 11/30/2022 10:52 AM  PROCEDURE: XR CHEST 1 VW-  INDICATIONS: cough/congestion  COMPARISON: 11/30/2022  TECHNIQUE: Single radiographic view of the chest was obtained.  FINDINGS: Evaluation of the bases is limited by overlying soft tissue. No focal pulmonary consolidations are evident. Pulmonary vascularity appears within normal limits. Heart size and mediastinal contour are within normal limits. No pneumothorax is seen.      No acute cardiopulmonary findings.  This report was finalized on 11/30/2022 11:11 AM by Vinny Patel MD.                Pending Labs     Order Current Status    Blood Culture - Blood, Arm, Left Preliminary result    Blood Culture - Blood, Arm, Left Preliminary result        Discharge Details        Discharge Medications      New Medications      Instructions Start Date   amoxicillin-clavulanate 875-125 MG per tablet  Commonly known as: Augmentin   1 tablet, Oral, 2 Times Daily      cyclobenzaprine 10 MG tablet  Commonly known as: FLEXERIL   10 mg, Oral, Every 8 Hours Scheduled         Continue These Medications      Instructions Start Date   acetaminophen 500 MG tablet  Commonly known as: TYLENOL   1,000 mg, Oral, Every 6 Hours      amphetamine-dextroamphetamine 20 MG tablet  Commonly known as: ADDERALL   20 mg, Oral, Daily      ARIPiprazole 10 MG  tablet  Commonly known as: ABILIFY   10 mg, Oral, Daily      buPROPion  MG 12 hr tablet  Commonly known as: WELLBUTRIN SR   150 mg, Oral, 2 Times Daily      ibuprofen 600 MG tablet  Commonly known as: ADVIL,MOTRIN   600 mg, Oral, Every 4 Hours PRN      lamoTRIgine 150 MG tablet  Commonly known as: LaMICtal   100 mg, 2 Times Daily      levothyroxine 88 MCG tablet  Commonly known as: SYNTHROID, LEVOTHROID   88 mcg, Oral, Daily      traZODone 50 MG tablet  Commonly known as: DESYREL   50 mg, Oral, Nightly         Stop These Medications    clindamycin 300 MG capsule  Commonly known as: CLEOCIN     predniSONE 10 MG tablet  Commonly known as: DELTASONE          No Known Allergies    Discharge Disposition:  Home or Self Care    Diet:  Hospital:  Diet Order   Procedures   • Diet: Regular/House Diet; Texture: Regular Texture (IDDSI 7); Fluid Consistency: Thin (IDDSI 0)     Activity:  As tolerated    Restrictions or Other Recommendations:   Follow up with Dr. Contreras - [place] [facility] as recommended       CODE STATUS:    Code Status and Medical Interventions:   Ordered at: 11/30/22 1329     Level Of Support Discussed With:    Patient     Code Status (Patient has no pulse and is not breathing):    CPR (Attempt to Resuscitate)     Medical Interventions (Patient has pulse or is breathing):    Full Support       Future Appointments   Date Time Provider Department [place]   12/12/2022 10:00 AM Luna Espinosa APRN [facility]   1/5/2023  9:00 AM Luna Espinosa APRN [facility]   2/6/2023  1:30 PM Bird Davis MD [facility]       Additional Instructions for the Follow-ups that You Need to Schedule     Discharge Follow-up with PCP   As directed       Currently Documented PCP:    Sammy Crowley DO    PCP Phone Number:    [phone]     Follow Up Details: Primary Care Provider - 1 week         Discharge Follow-up with Specialty: Ear, Nose, Throat Doctor - PETERSON Contreras MD; 3 Weeks   As  directed      Specialty: Ear, Nose, Throat Doctor - PETERSON Contreras MD    Follow Up: 3 Weeks    Follow Up Details: follow up with [place] [facility] - Dr. Contreras               Discussed discharge plans with Dr. Contreras and the patient on the day of discharge    Wisam Hardin MD  12/03/22    Time Spent on Discharge:  I spent  44  minutes on this discharge activity which included: face-to-face encounter with the patient, reviewing the data in the system, coordination of the care with the nursing staff as well as consultants, documentation, and entering orders.

## 2022-12-05 LAB — BACTERIA SPEC AEROBE CULT: NORMAL

## 2022-12-06 LAB — BACTERIA SPEC AEROBE CULT: NORMAL

## 2023-01-19 ENCOUNTER — OFFICE VISIT (OUTPATIENT)
Dept: BARIATRICS/WEIGHT MGMT | Facility: CLINIC | Age: 53
End: 2023-01-19
Payer: COMMERCIAL

## 2023-01-19 VITALS
WEIGHT: 235.5 LBS | HEART RATE: 77 BPM | BODY MASS INDEX: 37.85 KG/M2 | DIASTOLIC BLOOD PRESSURE: 80 MMHG | SYSTOLIC BLOOD PRESSURE: 124 MMHG | HEIGHT: 66 IN | OXYGEN SATURATION: 100 %

## 2023-01-19 DIAGNOSIS — K59.04 CHRONIC IDIOPATHIC CONSTIPATION: ICD-10-CM

## 2023-01-19 DIAGNOSIS — F98.8 ATTENTION DEFICIT DISORDER (ADD) WITHOUT HYPERACTIVITY: ICD-10-CM

## 2023-01-19 DIAGNOSIS — E78.00 HYPERCHOLESTEREMIA: ICD-10-CM

## 2023-01-19 DIAGNOSIS — R53.83 OTHER FATIGUE: ICD-10-CM

## 2023-01-19 DIAGNOSIS — E03.9 HYPOTHYROIDISM, UNSPECIFIED TYPE: ICD-10-CM

## 2023-01-19 DIAGNOSIS — E66.09 CLASS 2 OBESITY DUE TO EXCESS CALORIES WITHOUT SERIOUS COMORBIDITY WITH BODY MASS INDEX (BMI) OF 38.0 TO 38.9 IN ADULT: Primary | ICD-10-CM

## 2023-01-19 DIAGNOSIS — E55.9 VITAMIN D DEFICIENCY: ICD-10-CM

## 2023-01-19 DIAGNOSIS — Z87.42 HISTORY OF PCOS: ICD-10-CM

## 2023-01-19 DIAGNOSIS — R63.2 POLYPHAGIA: ICD-10-CM

## 2023-01-19 PROBLEM — F31.9 BIPOLAR DISEASE, CHRONIC (HCC): Status: ACTIVE | Noted: 2023-01-19

## 2023-01-19 PROCEDURE — 99215 OFFICE O/P EST HI 40 MIN: CPT | Performed by: NURSE PRACTITIONER

## 2023-01-19 RX ORDER — ALUMINUM ZIRCONIUM OCTACHLOROHYDREX GLY 16 G/100G
GEL TOPICAL 3 TIMES DAILY
Refills: 12
Start: 2023-01-19

## 2023-01-19 RX ORDER — HYDROXYZINE PAMOATE 50 MG/1
CAPSULE ORAL
COMMUNITY
Start: 2023-01-16

## 2023-01-19 RX ORDER — DEXTROAMPHETAMINE SACCHARATE, AMPHETAMINE ASPARTATE, DEXTROAMPHETAMINE SULFATE AND AMPHETAMINE SULFATE 5; 5; 5; 5 MG/1; MG/1; MG/1; MG/1
20 TABLET ORAL DAILY
COMMUNITY

## 2023-01-19 RX ORDER — CHLORAL HYDRATE 500 MG
1000 CAPSULE ORAL 2 TIMES DAILY WITH MEALS
Start: 2023-01-19

## 2023-01-19 RX ORDER — LAMOTRIGINE 200 MG/1
200 TABLET ORAL DAILY
COMMUNITY
Start: 2023-01-16

## 2023-01-19 RX ORDER — FLUOXETINE HYDROCHLORIDE 40 MG/1
40 CAPSULE ORAL EVERY MORNING
COMMUNITY
Start: 2023-01-16

## 2023-01-19 RX ORDER — DEXTROAMPHETAMINE SACCHARATE, AMPHETAMINE ASPARTATE, DEXTROAMPHETAMINE SULFATE AND AMPHETAMINE SULFATE 2.5; 2.5; 2.5; 2.5 MG/1; MG/1; MG/1; MG/1
10 TABLET ORAL AS NEEDED
COMMUNITY

## 2023-01-19 RX ORDER — MULTIPLE VITAMINS W/ MINERALS TAB 9MG-400MCG
1 TAB ORAL DAILY
Qty: 30 TABLET | Refills: 2
Start: 2023-01-19

## 2023-01-19 NOTE — ASSESSMENT & PLAN NOTE
Patient's (Body mass index is 38.01 kg/m².) indicates that they are morbidly/severely obese (BMI > 40 or > 35 with obesity - related health condition) with health conditions that include dyslipidemias and depression/anxiety, bipolar, ADD . Weight is newly identified. BMI is is above average; BMI management plan is completed. We discussed low calorie, low carb based diet program, portion control, increasing exercise, management of depression/anxiety/stress to control compensatory eating and an angelica-based approach such as Devver Pal or Lose It.     Topics of discussion included obesity as a disease, nutritional education on food groups, exercise, and medications. Patient was instructed in adequate protein, controlled carb and controlled fat intake.   Patient received instructions on using the medicines as a tool in controlling their weight with nutritional and behavioral changes. Risks and benefits were discussed. I believe the potential benefits of medication helping to decrease weight outweighs the risks. Patient is to try nutritonal/behavioral changes only first.   Patient received our clinic education booklet.   Our patient consent form was reviewed including potential risks of weight loss. We also reviewed our confidentiality and HIPPA statements. Patients current FITT score was reviewed along with current capability for exercise tolerance and a patient will work towards a FITT score of:     Frequency   Intensity Time Strength Training   []   0 None  []   0 None  []   0 None  []   0 None    []   1 (1-2x/week) []   1 (light) []   1 (<10 min) []   1 (1x/week)   [x]   2 (3-5x/week) [x]   2 (moderate) []   2 (10-20 min) [x]   2 (2x/week)   []   3 (daily)   []   3 (moderately hard)  []   4 (very hard) []   3 (20-30 min)  [x]   4 (>30 min) []   3 (3-4x/week)       Patient's past medical history was reviewed in detail and barriers to weight loss were identified and discussed. Past efforts at weight reduction on their  own as well as under physician supervision were documented and discussed.  I advised patient to continue routine care with their Primary Care Provider.     Nutritional recommendations and goals were reviewed including Calories:1640-0648 daily adjusted for exercise calories burnt, Protein: g daily, Net carbs (total carb - fiber) of 50-75g per day.  Start to keep a food journal and bring into next visit in 2 weeks for review. Practice the behavioral modification technique of mindful eating. Take one MVI daily and 2000mg fish oil daily. Take other medications and supplements as directed.  Complete labwork today.   Increase cardio to 150 minutes a week in moderate intensity zone.   Start metamucil before meals to help with satiety.   Discuss option of topamax with psychiatrist due to potential side effect of depression. Also will discuss alternatives to abilify/lamictal.   AOM options: saxenda or wegovy again (tolerated previously but did not lose weight, was also not dieting), topamax (try to replace trazadone), metformin to balance weight positive effect of abilify and lamictal. Stimulants contraindicated due to adderall. Already on wellbutrin, could add naltrexone to mimic contrave.

## 2023-01-19 NOTE — ASSESSMENT & PLAN NOTE
Lack of satiety. Likely worsened by lamictal and abilify. Increase protein and fiber and decrease simple carbs. Add metamucil before meals and consider appetite suppressant at next visit.

## 2023-01-19 NOTE — ASSESSMENT & PLAN NOTE
Psychological condition is improving with treatment.  Continue current treatment regimen.  Regular aerobic exercise.  cotinue with psychiatrist and therapist  Psychological condition  will be reassessed at the next regular appointment.  Previously on vyvanse but it wore off, now stable on wellbutrin and adderall.   ADD is often associated with disordered eating.

## 2023-01-19 NOTE — ASSESSMENT & PLAN NOTE
Lipid abnormalities are newly identified.  Nutritional counseling was provided.  Lipids will be reassessed in 1 year.  . Weight reduction and calorie restriction with limited saturated fat should help. Increase fiber (metamucil)

## 2023-01-19 NOTE — PROGRESS NOTES
"  [facility]  [address]  [place] [address]   Office Note      Date: 2023  Patient Name: Erika Alfonso  MRN: [id number]  : 1970    Subjective     Chief Complaint  Obesity Management consult, nutrition counseling          Erika Alfonso presents to [facility] for obesity management. She is here because she thinks her anxiety would improve with weight loss, wants to get to 175lb. Wants to increase longevity, be healthier, live longer for her kids.  Weight history:  Started having trouble with her weight in childhood. \"I was a chubby kid\" and went to the doctor and was told to stop drinking milk.   Overweight in high school and college. When trying to getting pregnant did in vitro twice, did injections. Was stress eating at that time.   Was 265lb during COVID. Lost 30lb doing [facility] in [place]. Took wegovy and saxenda and did not lose weight. Was not given nutritional counseling. PCP checked thyroid and put her on levothyroxine at that time. TSH was 8.   Highest lifetime weight: 265 pounds. Today's weight is 107 kg (235 lb 8 oz)235.8 pounds.   Weight 5 years ago: 200  Pertinent medical history:  Sees psychiatrist next month. Sees a therapist once a month [facility]- treating OCD, anxiety, son's attempted suicide at the end of the year. Will be discussing tapering off of abilify in the next month.   OCD: just increased prozac to 40mg this week.   Adult ADD diagnosed - took vyvanse several years but it got to where it didn't work. Switched to adderall and wellbutrin and that has worked really well.   Insomnia: using trazadone almost nightly the last month after stress with her son, prior to that she didn't use often. Sleeping 7.5hours a night uninterrupted.  PCOS: had complete hysterectomy, prior to that had ovarian cysts. Took metformin for a period of time and it did not help, was unable to get " pregnant.    Constipation: chronic, diagnosed as a child. takes linzess daily. Has follow up colonoscopy in February.   Getting tonsils out next month, had tonsil abscess .   Hesitant to have gastric surgery, friend passed away after bypass surgery at age 38. She is not interested in any weight loss surgeries.  Current lifestyle  Choosing wrong foods certain times of the day- particularly during downtime. Also has extreme hunger, not full after 3 pieces of pizza, banana, pudding.   She is active- has elliptical and treadmill twice a week, does yoga and pilates once or twice a week. Does strength training at home 2-3 times a week (works at home).   The following seem to sabotage weight loss efforts:comfort/stress eating, social events, always hungry and specific cravings like carbohydrates    Review of Systems   Constitutional: Positive for unexpected weight gain. Negative for fatigue.        Positive for weight gain   HENT: Negative for trouble swallowing.         Negative for throat swelling   Respiratory: Positive for apnea. Negative for shortness of breath and wheezing.         Negative for snoring   Cardiovascular: Positive for leg swelling. Negative for chest pain and palpitations.   Gastrointestinal: Negative for abdominal pain, constipation, diarrhea, GERD and indigestion.   Endocrine: Positive for polyphagia. Negative for cold intolerance, heat intolerance, polydipsia and polyuria.        Negative for loss of hair  Negative for hirsutism     Genitourinary:        Denies menstrual irregularities   Musculoskeletal: Negative for arthralgias.        Denies exercise limitations  Denies chronic pain   Skin: Positive for dry skin.        Negative for acne   Neurological: Negative for headache and memory problem.        Negative for paresthesias   Psychiatric/Behavioral: Positive for depressed mood. Negative for self-injury, sleep disturbance and suicidal ideas. The patient is nervous/anxious.      PHQ-9 Total  "Score: 11       Objective   Body mass index is 38.01 kg/m².  Body composition analysis completed and showed:   %body fat: 51.5    Measurements (in inches)  Neck: 14  Chest: 44.5  Waist: 44.25  Hips: 52  Thighs: 43    Vital Signs:   /80   Pulse 77   Ht 167.6 cm (66\")   Wt 107 kg (235 lb 8 oz)   SpO2 100%   BMI 38.01 kg/m²     Physical Exam   General appears stated age and normal appearance   HEENT PERRLA, EOM intact and conjunctivae normal   Chest/lungs Normal rate, Regular rhythm, Breathing is unlabored and Clear to auscultation bilaterally   Abdomen Soft, normal bowel sounds, without mass or tenderness and Central adiposity   Extremities without edema   Neuro Normal DTRs, Good historian and No focal deficit   Skin Warm, dry, intact   Psych normal behavior, normal thought content and normal concentration     Result Review :                   Assessment / Plan       Diagnoses and all orders for this visit:    1. Class 2 obesity due to excess calories without serious comorbidity with body mass index (BMI) of 38.0 to 38.9 in adult (Primary)  Assessment & Plan:  Patient's (Body mass index is 38.01 kg/m².) indicates that they are morbidly/severely obese (BMI > 40 or > 35 with obesity - related health condition) with health conditions that include dyslipidemias and depression/anxiety, bipolar, ADD . Weight is newly identified. BMI is is above average; BMI management plan is completed. We discussed low calorie, low carb based diet program, portion control, increasing exercise, management of depression/anxiety/stress to control compensatory eating and an angelica-based approach such as [employer] Pal or Lose It.     Topics of discussion included obesity as a disease, nutritional education on food groups, exercise, and medications. Patient was instructed in adequate protein, controlled carb and controlled fat intake.   Patient received instructions on using the medicines as a tool in controlling their weight with " nutritional and behavioral changes. Risks and benefits were discussed. I believe the potential benefits of medication helping to decrease weight outweighs the risks. Patient is to try nutritonal/behavioral changes only first.   Patient received our clinic education booklet.   Our patient consent form was reviewed including potential risks of weight loss. We also reviewed our confidentiality and HIPPA statements. Patients current FITT score was reviewed along with current capability for exercise tolerance and a patient will work towards a FITT score of:     Frequency   Intensity Time Strength Training   []   0 None  []   0 None  []   0 None  []   0 None    []   1 (1-2x/week) []   1 (light) []   1 (<10 min) []   1 (1x/week)   [x]   2 (3-5x/week) [x]   2 (moderate) []   2 (10-20 min) [x]   2 (2x/week)   []   3 (daily)   []   3 (moderately hard)  []   4 (very hard) []   3 (20-30 min)  [x]   4 (>30 min) []   3 (3-4x/week)       Patient's past medical history was reviewed in detail and barriers to weight loss were identified and discussed. Past efforts at weight reduction on their own as well as under physician supervision were documented and discussed.  I advised patient to continue routine care with their Primary Care Provider.     Nutritional recommendations and goals were reviewed including Calories:[id number] daily adjusted for exercise calories burnt, Protein: g daily, Net carbs (total carb - fiber) of 50-75g per day.  Start to keep a food journal and bring into next visit in 2 weeks for review. Practice the behavioral modification technique of mindful eating. Take one MVI daily and 2000mg fish oil daily. Take other medications and supplements as directed.  Complete labwork today.   Increase cardio to 150 minutes a week in moderate intensity zone.   Start metamucil before meals to help with satiety.   Discuss option of topamax with psychiatrist due to potential side effect of depression. Also will discuss  alternatives to abilify/lamictal.   AOM options: saxenda or wegovy again (tolerated previously but did not lose weight, was also not dieting), topamax (try to replace trazadone), metformin to balance weight positive effect of abilify and lamictal. Stimulants contraindicated due to adderall. Already on wellbutrin, could add naltrexone to mimic contrave.     Orders:  -     Testosterone, Free, Total  -     Hemoglobin A1c  -     Vitamin B12  -     Folate  -     Comprehensive Metabolic Panel  -     CBC (No Diff)  -     Insulin, Total  -     multivitamin with minerals (Multivitamin Adults) tablet tablet; Take 1 tablet by mouth Daily.  Dispense: 30 tablet; Refill: 2  -     Omega-3 Fatty Acids (fish oil) 1000 MG capsule capsule; Take 1 capsule by mouth 2 (Two) Times a Day With Meals.  -     psyllium (Metamucil Smooth Texture) 58.6 % powder; Take  by mouth 3 (Three) Times a Day.; Refill: 12    2. Hypercholesteremia  Assessment & Plan:  Lipid abnormalities are newly identified.  Nutritional counseling was provided.  Lipids will be reassessed in 1 year.  . Weight reduction and calorie restriction with limited saturated fat should help. Increase fiber (metamucil)    Orders:  -     Omega-3 Fatty Acids (fish oil) 1000 MG capsule capsule; Take 1 capsule by mouth 2 (Two) Times a Day With Meals.  -     psyllium (Metamucil Smooth Texture) 58.6 % powder; Take  by mouth 3 (Three) Times a Day.; Refill: 12    3. Vitamin D deficiency  Assessment & Plan:  Previously on prescription dosing, not current taking any supplements.     Orders:  -     Calcitriol (1,25 di-OH Vitamin D)  -     multivitamin with minerals (Multivitamin Adults) tablet tablet; Take 1 tablet by mouth Daily.  Dispense: 30 tablet; Refill: 2    4. History of PCOS  Assessment & Plan:  Screen for elevated androgens. May still benefit from metformin despite hysterectomy.     Orders:  -     Testosterone, Free, Total  -     Insulin, Total    5. Hypothyroidism, unspecified  type  Assessment & Plan:  Diagnosed fall 2022. Taking levothyroxine 88mcg, managed by PCP who she will see next month. I will check current TSH as well and fT3 and fT4.     Orders:  -     TSH  -     T4, Free  -     T3, Free    6. Attention deficit disorder (ADD) without hyperactivity  Assessment & Plan:  Psychological condition is improving with treatment.  Continue current treatment regimen.  Regular aerobic exercise.  cotinue with psychiatrist and therapist  Psychological condition  will be reassessed at the next regular appointment.  Previously on vyvanse but it wore off, now stable on wellbutrin and adderall.   ADD is often associated with disordered eating.       7. Polyphagia  Assessment & Plan:  Lack of satiety. Likely worsened by lamictal and abilify. Increase protein and fiber and decrease simple carbs. Add metamucil before meals and consider appetite suppressant at next visit.        8. Other fatigue  -     TSH  -     T4, Free  -     T3, Free  -     Vitamin B12  -     Folate  -     CBC (No Diff)    9. Chronic idiopathic constipation  -     psyllium (Metamucil Smooth Texture) 58.6 % powder; Take  by mouth 3 (Three) Times a Day.; Refill: 12       I spent 60 minutes caring for Erika on this date of service. This time includes time spent by me in the following activities:preparing for the visit, reviewing tests, obtaining and/or reviewing a separately obtained history, performing a medically appropriate examination and/or evaluation , counseling and educating the patient/family/caregiver, ordering medications, tests, or procedures and documenting information in the medical record  Follow Up   Return in about 2 weeks (around 2/2/2023) for Next scheduled follow up, Labs this visit.  Patient was given instructions and counseling regarding her condition or for health maintenance advice. Please see specific information pulled into the AVS if appropriate.     Nannette Haas, APRN  01/19/2023

## 2023-01-24 ENCOUNTER — LAB REQUISITION (OUTPATIENT)
Dept: LAB | Facility: HOSPITAL | Age: 53
End: 2023-01-24
Payer: COMMERCIAL

## 2023-01-24 DIAGNOSIS — J35.01 CHRONIC TONSILLITIS: ICD-10-CM

## 2023-01-24 PROCEDURE — 88304 TISSUE EXAM BY PATHOLOGIST: CPT

## 2023-01-26 LAB
1,25(OH)2D SERPL-MCNC: 28.1 PG/ML (ref 24.8–81.5)
ALBUMIN SERPL-MCNC: 4.7 G/DL (ref 3.8–4.9)
ALBUMIN/GLOB SERPL: 1.9 {RATIO} (ref 1.2–2.2)
ALP SERPL-CCNC: 124 IU/L (ref 44–121)
ALT SERPL-CCNC: 19 IU/L (ref 0–32)
AST SERPL-CCNC: 23 IU/L (ref 0–40)
BILIRUB SERPL-MCNC: 0.4 MG/DL (ref 0–1.2)
BUN SERPL-MCNC: 12 MG/DL (ref 6–24)
BUN/CREAT SERPL: 12 (ref 9–23)
CALCIUM SERPL-MCNC: 9.1 MG/DL (ref 8.7–10.2)
CHLORIDE SERPL-SCNC: 101 MMOL/L (ref 96–106)
CO2 SERPL-SCNC: 21 MMOL/L (ref 20–29)
CREAT SERPL-MCNC: 0.97 MG/DL (ref 0.57–1)
EGFRCR SERPLBLD CKD-EPI 2021: 70 ML/MIN/1.73
ERYTHROCYTE [DISTWIDTH] IN BLOOD BY AUTOMATED COUNT: 13.4 % (ref 11.7–15.4)
FOLATE SERPL-MCNC: 5.6 NG/ML
GLOBULIN SER CALC-MCNC: 2.5 G/DL (ref 1.5–4.5)
GLUCOSE SERPL-MCNC: 92 MG/DL (ref 70–99)
HBA1C MFR BLD: 5.2 % (ref 4.8–5.6)
HCT VFR BLD AUTO: 42.5 % (ref 34–46.6)
HGB BLD-MCNC: 14.1 G/DL (ref 11.1–15.9)
INSULIN SERPL-ACNC: 5.8 UIU/ML (ref 2.6–24.9)
MCH RBC QN AUTO: 28.7 PG (ref 26.6–33)
MCHC RBC AUTO-ENTMCNC: 33.2 G/DL (ref 31.5–35.7)
MCV RBC AUTO: 86 FL (ref 79–97)
PLATELET # BLD AUTO: 273 X10E3/UL (ref 150–450)
POTASSIUM SERPL-SCNC: 4.6 MMOL/L (ref 3.5–5.2)
PROT SERPL-MCNC: 7.2 G/DL (ref 6–8.5)
RBC # BLD AUTO: 4.92 X10E6/UL (ref 3.77–5.28)
REF LAB TEST METHOD: NORMAL
SODIUM SERPL-SCNC: 138 MMOL/L (ref 134–144)
T3FREE SERPL-MCNC: 2.5 PG/ML (ref 2–4.4)
T4 FREE SERPL-MCNC: 1.21 NG/DL (ref 0.82–1.77)
TESTOST FREE SERPL-MCNC: 2.9 PG/ML (ref 0–4.2)
TESTOST SERPL-MCNC: 33 NG/DL (ref 4–50)
TSH SERPL DL<=0.005 MIU/L-ACNC: 1.63 UIU/ML (ref 0.45–4.5)
VIT B12 SERPL-MCNC: 524 PG/ML (ref 232–1245)
WBC # BLD AUTO: 4.9 X10E3/UL (ref 3.4–10.8)

## 2023-02-01 ENCOUNTER — OFFICE VISIT (OUTPATIENT)
Dept: BARIATRICS/WEIGHT MGMT | Facility: CLINIC | Age: 53
End: 2023-02-01
Payer: COMMERCIAL

## 2023-02-01 ENCOUNTER — PRIOR AUTHORIZATION (OUTPATIENT)
Dept: BARIATRICS/WEIGHT MGMT | Facility: CLINIC | Age: 53
End: 2023-02-01
Payer: COMMERCIAL

## 2023-02-01 VITALS
SYSTOLIC BLOOD PRESSURE: 128 MMHG | BODY MASS INDEX: 37.38 KG/M2 | DIASTOLIC BLOOD PRESSURE: 78 MMHG | HEART RATE: 87 BPM | HEIGHT: 66 IN | WEIGHT: 232.6 LBS | OXYGEN SATURATION: 97 %

## 2023-02-01 DIAGNOSIS — E66.09 CLASS 2 OBESITY DUE TO EXCESS CALORIES WITHOUT SERIOUS COMORBIDITY WITH BODY MASS INDEX (BMI) OF 37.0 TO 37.9 IN ADULT: Primary | ICD-10-CM

## 2023-02-01 DIAGNOSIS — E78.00 HYPERCHOLESTEREMIA: ICD-10-CM

## 2023-02-01 DIAGNOSIS — E55.9 VITAMIN D DEFICIENCY: ICD-10-CM

## 2023-02-01 DIAGNOSIS — R63.2 POLYPHAGIA: ICD-10-CM

## 2023-02-01 DIAGNOSIS — F31.9 BIPOLAR DISEASE, CHRONIC: ICD-10-CM

## 2023-02-01 PROCEDURE — 99214 OFFICE O/P EST MOD 30 MIN: CPT | Performed by: NURSE PRACTITIONER

## 2023-02-01 PROCEDURE — 96372 THER/PROPH/DIAG INJ SC/IM: CPT | Performed by: NURSE PRACTITIONER

## 2023-02-01 RX ORDER — ERGOCALCIFEROL 1.25 MG/1
50000 CAPSULE ORAL WEEKLY
Qty: 8 CAPSULE | Refills: 0 | Status: SHIPPED | OUTPATIENT
Start: 2023-02-01 | End: 2023-02-27

## 2023-02-01 NOTE — ASSESSMENT & PLAN NOTE
Stable. Managed by pscychiatry. Will continue current medications at this time, understands abilify can be weight positive. Consider metformin if weight loss is slower than expected on wegovy.

## 2023-02-01 NOTE — PROGRESS NOTES
[facility]  [address]  [place] [address]     Office Note      Date: 2023  Patient Name: Erika Alfonso  MRN: [id number]  : 1970  Subjective  Subjective     Chief Complaint  Obesity Management follow-up          Erika Alfonso presents to [facility] for obesity management. This is her initial 2 week follow up. She is working on the following lifestyle changes: She is more mindful of her food choices, added premier protein once  Day. Food journal reviewed, calories <1500, protein >100 and net carb <75g/day.   Patient is satisfied with weight loss progress. Appetite is moderately controlled. Reports no side effects of prescribed medications today. The patient is not taking multivitamin and is taking fish oil. The patient is using a food journal.  Living on applesauce and sugar free jello and ice cream, had tonsillectomy on 22 since then. She also had a follow up with psychiatry and they decided to wait on any changes to abilify at this time since I am starting her on a new medication.   The patient is exercising with a FITT score of:    Frequency Intensity Time Strength Training   []   0, none []   0 []   0 []   0   [x]   1 (1-2x/week) []   1 (light) []   1 (<10 min) []   1 (1x/week)   []   2 (3-5x/week) [x]   2 (moderate) [x]   2 (10-20 min) [x]   2 (2x/week)   []   3 (daily) []   3 (moderately hard)  []   4 (very hard) []   3 (20-30 min)  []   4 (>30 min) []   3 (3-4x/week)     Review of Systems   Constitutional: Negative for appetite change and fatigue.   Eyes: Negative for blurred vision, double vision and visual disturbance.   Cardiovascular: Negative for chest pain and palpitations.   Gastrointestinal: Negative for abdominal pain, constipation, diarrhea, nausea, vomiting and GERD.   Endocrine: Negative for polydipsia, polyphagia and polyuria.   Musculoskeletal: Negative for arthralgias, back pain and myalgias.  "  Neurological: Negative for dizziness, tremors, light-headedness, headache and memory problem.        Parasthesias negative   Psychiatric/Behavioral: Negative for sleep disturbance, depressed mood and stress. The patient is not nervous/anxious.        Objective   Start weight: 265 pounds.    Total Loss lb/%Loss of beginning body weight (BBW): -32.4lb/-12.23%  Change in weight since last visit: -2.4    Body mass index is 37.54 kg/m².   Body composition analysis completed and showed:   %body fat: 52.6  Measurements (in inches)  Waist: 44    Vital Signs:   /78   Pulse 87   Ht 167.6 cm (66\")   Wt 106 kg (232 lb 9.6 oz)   SpO2 97%   BMI 37.54 kg/m²     Physical Exam   General appears stated age and normal appearance   HEENT PERRLA, EOM intact and conjunctivae normal   Chest/lungs Normal rate, Regular rhythm and Breathing is unlabored   Extremities without edema   Neuro Good historian and No focal deficit   Skin Warm, dry, intact   Psych normal behavior, normal thought content and normal concentration     Result Review :   The following data was reviewed by: CYNTHIA Rivera on 02/01/2023:  Labs reviewed. We will discuss all of the following in more detail at your follow up visit:  Fasting blood sugar, three month average of blood sugar (A1c), and fasting insulin are all in the normal range, no signs of diabetes or pre-diabetes.   Your chemistry panel is unremarkable.   Your thyroid levels are normal and ideal.   Your vitamin D level is low. I will prescribe a treatment for this at your visit.   Your b-12 and folate are normal and ideal.   Your testosterone levels are in the normal range.   Your blood count is in the normal range.   Common labs    Common Labs 11/30/22 11/30/22 1/19/23 1/19/23 1/19/23    1140 1140 1044 1044 1044   Glucose  92  92    BUN  11  12    Creatinine  0.84  0.97    Sodium  138  138    Potassium  4.1  4.6    Chloride  101  101    Calcium  9.0  9.1    Total Protein    7.2    Albumin  " 4.20  4.7    Total Bilirubin  0.6  0.4    Alkaline Phosphatase  137 (A)  124 (A)    AST (SGOT)  10  23    ALT (SGPT)  11  19    WBC 7.48    4.9   Hemoglobin 12.6    14.1   Hematocrit 38.2    42.5   Platelets 213    273   Hemoglobin A1C   5.2     (A) Abnormal value       Comments are available for some flowsheets but are not being displayed.                    Assessment / Plan        Diagnoses and all orders for this visit:    1. Class 2 obesity due to excess calories without serious comorbidity with body mass index (BMI) of 37.0 to 37.9 in adult (Primary)  Assessment & Plan:  Patient's (Body mass index is 37.54 kg/m².) indicates that they are morbidly/severely obese (BMI > 40 or > 35 with obesity - related health condition) with health conditions that include dyslipidemias and anxiety . Weight is improving with treatment. BMI  is above average; BMI management plan is completed. We discussed low calorie, low carb based diet program, portion control, increasing exercise, joining a fitness center or start home based exercise program, management of depression/anxiety/stress to control compensatory eating, pharmacologic options including wegovy and an angelica-based approach such as [employer] Pal or Lose It.    I have instructed the patient to continue with pursuit of medical weight loss as a part of this program. Patient does meet criteria for use of anorectics at this time as BMI >30  and is not at treatment goal.     Continue nutritional focus and work towards new exercise FITT goal of: 2-2-4-2.  The current plan for this month includes: weight loss goal 6-8lbs this month, continue to work on lifestyle behavioral changes and continue nutrition focus with daily food journal and macronutrient goals as discussed at first visit.   We discussed different anti-obesity mediation options, will start wegovy today. Previously tolerated.  Start Wegovy. Denies family or personal history of pancreatitis, MTC, or MEN 2. Discussed  common side effects of nausea, diarrhea, vomiting, constipation, stomach pain, headache, fatigue, upset stomach, dizziness, feeling bloated, belching, gas, stomach flu, heartburn.   First injection of Wegovy 0.25mg was administered in the office today RLQ, no complications. Sample pen provided to patient along with content regarding use of the pen, dosing schedule, savings opportunities, and helpful tips.            2. Vitamin D deficiency  Assessment & Plan:  We discussed that adipose tissue sequesters Vitamin D and it often coexists with obesity. Treat with prescription dosing. Reassess in 2 months.         Orders:  -     vitamin D (ERGOCALCIFEROL) 1.25 MG (60042 UT) capsule capsule; Take 1 capsule by mouth 1 (One) Time Per Week.  Dispense: 8 capsule; Refill: 0    3. Hypercholesteremia  Assessment & Plan:  Lipid abnormalities are newly identified.  Nutritional counseling was provided.  Lipids will be reassessed in 1 year.  Continue metamucil, tolerating well.       4. Polyphagia  Assessment & Plan:  Improving with diet changes. Continue daily protein shake. Start wegovy.       5. Bipolar disease, chronic (HCC)  Assessment & Plan:  Stable. Managed by pscychiatry. Will continue current medications at this time, understands abilify can be weight positive. Consider metformin if weight loss is slower than expected on wegovy.         I spent 30 minutes caring for Erika on this date of service. This time includes time spent by me in the following activities:preparing for the visit, reviewing tests, obtaining and/or reviewing a separately obtained history, performing a medically appropriate examination and/or evaluation , counseling and educating the patient/family/caregiver, ordering medications, tests, or procedures, documenting information in the medical record and care coordination  Follow Up   Return in about 4 weeks (around 3/1/2023) for Next scheduled follow up.  Patient was given instructions and counseling  regarding her condition or for health maintenance advice. Please see specific information pulled into the AVS if appropriate.     Nannette Haas, CYNTHIA  02/01/2023

## 2023-02-01 NOTE — TELEPHONE ENCOUNTER
KACI SCHRADER (Key: [id number])  Wegovy 0.25MG/0.5ML auto-injectors    Approved from 02/01/2023 to 09/01/2023

## 2023-02-01 NOTE — ASSESSMENT & PLAN NOTE
We discussed that adipose tissue sequesters Vitamin D and it often coexists with obesity. Treat with prescription dosing. Reassess in 2 months.

## 2023-02-01 NOTE — ASSESSMENT & PLAN NOTE
Patient's (Body mass index is 37.54 kg/m².) indicates that they are morbidly/severely obese (BMI > 40 or > 35 with obesity - related health condition) with health conditions that include dyslipidemias and anxiety . Weight is improving with treatment. BMI  is above average; BMI management plan is completed. We discussed low calorie, low carb based diet program, portion control, increasing exercise, joining a fitness center or start home based exercise program, management of depression/anxiety/stress to control compensatory eating, pharmacologic options including wegovy and an angelica-based approach such as [employer] Pal or Lose It.    I have instructed the patient to continue with pursuit of medical weight loss as a part of this program. Patient does meet criteria for use of anorectics at this time as BMI >30  and is not at treatment goal.     Continue nutritional focus and work towards new exercise FITT goal of: 2-2-4-2.  The current plan for this month includes: weight loss goal 6-8lbs this month, continue to work on lifestyle behavioral changes and continue nutrition focus with daily food journal and macronutrient goals as discussed at first visit.   We discussed different anti-obesity mediation options, will start wegovy today. Previously tolerated.  Start Wegovy. Denies family or personal history of pancreatitis, MTC, or MEN 2. Discussed common side effects of nausea, diarrhea, vomiting, constipation, stomach pain, headache, fatigue, upset stomach, dizziness, feeling bloated, belching, gas, stomach flu, heartburn.   First injection of Wegovy 0.25mg was administered in the office today RLQ, no complications. Sample pen provided to patient along with content regarding use of the pen, dosing schedule, savings opportunities, and helpful tips.

## 2023-02-01 NOTE — ASSESSMENT & PLAN NOTE
Lipid abnormalities are newly identified.  Nutritional counseling was provided.  Lipids will be reassessed in 1 year.  Continue metamucil, tolerating well.

## 2023-02-24 DIAGNOSIS — E55.9 VITAMIN D DEFICIENCY: ICD-10-CM

## 2023-02-27 RX ORDER — ERGOCALCIFEROL 1.25 MG/1
CAPSULE ORAL
Qty: 4 CAPSULE | Refills: 1 | Status: SHIPPED | OUTPATIENT
Start: 2023-02-27

## 2023-03-01 ENCOUNTER — TELEMEDICINE (OUTPATIENT)
Dept: BARIATRICS/WEIGHT MGMT | Facility: CLINIC | Age: 53
End: 2023-03-01
Payer: COMMERCIAL

## 2023-03-01 VITALS
DIASTOLIC BLOOD PRESSURE: 67 MMHG | BODY MASS INDEX: 36.64 KG/M2 | TEMPERATURE: 97.8 F | HEIGHT: 66 IN | WEIGHT: 228 LBS | SYSTOLIC BLOOD PRESSURE: 114 MMHG

## 2023-03-01 DIAGNOSIS — E66.09 CLASS 2 OBESITY DUE TO EXCESS CALORIES WITHOUT SERIOUS COMORBIDITY WITH BODY MASS INDEX (BMI) OF 36.0 TO 36.9 IN ADULT: Primary | ICD-10-CM

## 2023-03-01 DIAGNOSIS — K59.09 OTHER CONSTIPATION: ICD-10-CM

## 2023-03-01 PROCEDURE — 99213 OFFICE O/P EST LOW 20 MIN: CPT | Performed by: NURSE PRACTITIONER

## 2023-03-01 RX ORDER — SEMAGLUTIDE 0.5 MG/.5ML
0.5 INJECTION, SOLUTION SUBCUTANEOUS WEEKLY
Qty: 2 ML | Refills: 0 | Status: SHIPPED | OUTPATIENT
Start: 2023-03-01 | End: 2023-03-30 | Stop reason: DRUGHIGH

## 2023-03-01 NOTE — PROGRESS NOTES
Office Note      Date: 2023  Patient Name: Erika Alfonso  MRN: [id number]  : 1970    Patient presents during the COVID-19 pandemic/federally declared [facility] of public health emergency.  This service was conducted via [employer].  Patient is located at her work address.  Provider is located at her primary office location. The use of a video visit has been reviewed with the patient and verbal informed consent has been obtained.  Subjective  Subjective     Chief Complaint  Obesity Management follow-up    Subjective          Erika Alfonso presents to [facility]NA[facility] via telehealth for obesity management.     Patient is satisfied with weight loss progress. Appetite is better controlled, cravings are less. Is getting full a little faster beginning of the week after her wegovy. Struggling with tracking things, waiting until the end of the day then can't remember what she's had. She is thinking about trying optavia, wants to know if that would be ok. Reports no side effects of prescribed medications today. She is having constipation even with daily metamucil.   The patient is exercising, walking outside a lot. Using resistance bands and light weight lifting.     Review of Systems   Constitutional: Negative for appetite change and fatigue.   Eyes: Negative for blurred vision, double vision and visual disturbance.   Cardiovascular: Negative for chest pain, palpitations and leg swelling.   Gastrointestinal: Positive for constipation and GERD. Negative for abdominal pain, diarrhea, nausea and vomiting.   Endocrine: Negative for polydipsia, polyphagia and polyuria.   Musculoskeletal: Negative for arthralgias, back pain and myalgias.   Neurological: Negative for dizziness, tremors, light-headedness, headache and memory problem.        Parasthesias negative   Psychiatric/Behavioral: Positive for depressed mood. Negative for sleep disturbance and stress. The patient is nervous/anxious.   "        Objective   Body mass index is 36.8 kg/m².  Start weight: 235.5 pounds.    Total weight loss: -7 pounds/-2.9%  Change in weight since last visit: -4lb    /67   Temp 97.8 °F (36.6 °C)   Ht 167.6 cm (66\")   Wt 103 kg (228 lb)   BMI 36.80 kg/m²       Result Review :                 Assessment and Plan    Diagnoses and all orders for this visit:    1. Class 2 obesity due to excess calories without serious comorbidity with body mass index (BMI) of 36.0 to 36.9 in adult (Primary)  Assessment & Plan:  Patient's (Body mass index is 36.8 kg/m².) indicates that they are morbidly/severely obese (BMI > 40 or > 35 with obesity - related health condition) with health conditions that include dyslipidemias . Weight is improving with treatment. BMI  is above average; BMI management plan is completed. We discussed low calorie, low carb based diet program, portion control, increasing exercise, joining a fitness center or start home based exercise program, management of depression/anxiety/stress to control compensatory eating and pharmacologic options including wegovy.    I have instructed the patient to continue with pursuit of medical weight loss as a part of this program. Patient does meet criteria for use of anorectics at this time as BMI >30 , is not at treatment goal and this medication is indicated for LONG TERM use for management of obesity.     Continue nutritional focus and work towards new exercise FITT goal of: 2-2-4-2. Recommend at least 150 minutes a week of moderate intensity exercise with 2 resistance training sessions weekly.     The current plan for this month includes: continue to work on lifestyle behavioral changes. Increase wegovy to 0.5mg.        Orders:  -     Semaglutide-Weight Management (Wegovy) 0.5 MG/0.5ML solution auto-injector; Inject 0.5 mL under the skin into the appropriate area as directed 1 (One) Time Per Week.  Dispense: 2 mL; Refill: 0    2. Other constipation  Assessment & " Plan:  Newly identified, side effect of wegovy. Increase water, fiber, physical activity. Continue daily metamucil. Add daily stool softener.         Follow Up   Return in about 4 weeks (around 3/29/2023) for Next scheduled follow up.  Patient was given instructions and counseling regarding her condition or for health maintenance advice. Please see specific information pulled into the AVS if appropriate.     CYNTHIA Mcwilliams

## 2023-03-01 NOTE — ASSESSMENT & PLAN NOTE
Newly identified, side effect of wegovy. Increase water, fiber, physical activity. Continue daily metamucil. Add daily stool softener.

## 2023-03-01 NOTE — ASSESSMENT & PLAN NOTE
Patient's (Body mass index is 36.8 kg/m².) indicates that they are morbidly/severely obese (BMI > 40 or > 35 with obesity - related health condition) with health conditions that include dyslipidemias . Weight is improving with treatment. BMI  is above average; BMI management plan is completed. We discussed low calorie, low carb based diet program, portion control, increasing exercise, joining a fitness center or start home based exercise program, management of depression/anxiety/stress to control compensatory eating and pharmacologic options including wegovy.    I have instructed the patient to continue with pursuit of medical weight loss as a part of this program. Patient does meet criteria for use of anorectics at this time as BMI >30 , is not at treatment goal and this medication is indicated for LONG TERM use for management of obesity.     Continue nutritional focus and work towards new exercise FITT goal of: 2-2-4-2. Recommend at least 150 minutes a week of moderate intensity exercise with 2 resistance training sessions weekly.     The current plan for this month includes: continue to work on lifestyle behavioral changes. Increase wegovy to 0.5mg.

## 2023-03-22 ENCOUNTER — TELEPHONE (OUTPATIENT)
Dept: BARIATRICS/WEIGHT MGMT | Facility: CLINIC | Age: 53
End: 2023-03-22
Payer: COMMERCIAL

## 2023-03-22 NOTE — TELEPHONE ENCOUNTER
Called patient to see if patient would like to stay at current dose of Wegovy (0.5 mg) or titrate up to 1.0 mg.     Patient did not answer: Left message to return call

## 2023-03-30 ENCOUNTER — TELEMEDICINE (OUTPATIENT)
Dept: BARIATRICS/WEIGHT MGMT | Facility: CLINIC | Age: 53
End: 2023-03-30
Payer: COMMERCIAL

## 2023-03-30 VITALS
HEIGHT: 66 IN | DIASTOLIC BLOOD PRESSURE: 77 MMHG | OXYGEN SATURATION: 98 % | BODY MASS INDEX: 36 KG/M2 | SYSTOLIC BLOOD PRESSURE: 115 MMHG | TEMPERATURE: 97.8 F | WEIGHT: 224 LBS

## 2023-03-30 DIAGNOSIS — E78.00 HYPERCHOLESTEREMIA: ICD-10-CM

## 2023-03-30 DIAGNOSIS — E66.09 CLASS 2 OBESITY DUE TO EXCESS CALORIES WITHOUT SERIOUS COMORBIDITY WITH BODY MASS INDEX (BMI) OF 36.0 TO 36.9 IN ADULT: Primary | ICD-10-CM

## 2023-03-30 DIAGNOSIS — R63.2 POLYPHAGIA: ICD-10-CM

## 2023-03-30 PROCEDURE — 99214 OFFICE O/P EST MOD 30 MIN: CPT | Performed by: NURSE PRACTITIONER

## 2023-03-30 RX ORDER — SEMAGLUTIDE 1 MG/.5ML
1 INJECTION, SOLUTION SUBCUTANEOUS WEEKLY
Qty: 2 ML | Refills: 0 | Status: SHIPPED | OUTPATIENT
Start: 2023-03-30

## 2023-03-30 NOTE — ASSESSMENT & PLAN NOTE
Continue to make healthy lifestyle choices with calorie deficit and regular aerobic exercise with limited saturated fat.

## 2023-03-30 NOTE — ASSESSMENT & PLAN NOTE
Patient's (Body mass index is 36.15 kg/m².) indicates that they are morbidly/severely obese (BMI > 40 or > 35 with obesity - related health condition) with health conditions that include dyslipidemias . Weight is improving with treatment. BMI  is above average; BMI management plan is completed. We discussed low calorie, low carb based diet program, portion control, increasing exercise, pharmacologic options including wegovy and an angelica-based approach such as [employer] Pal or Lose It.    I have instructed the patient to continue with pursuit of medical weight loss as a part of this program. Patient does meet criteria for use of anorectics at this time as BMI >30  and is not at treatment goal.     Continue nutritional focus and work towards new exercise FITT goal of: 2-2-4-2.  The current plan for this month includes: continue current exercise efforts, weight loss goal 6-8lbs this month and continue nutrition focus. Increase wegovy to 1mg. Will contact me in 3 weeks to let me know if she wants to continue this dose (only if bothersome side effects) or increase as planned.  Treatment goal 175lb.

## 2023-04-28 ENCOUNTER — PATIENT MESSAGE (OUTPATIENT)
Dept: BARIATRICS/WEIGHT MGMT | Facility: CLINIC | Age: 53
End: 2023-04-28
Payer: COMMERCIAL

## 2023-04-28 DIAGNOSIS — E66.09 CLASS 2 OBESITY DUE TO EXCESS CALORIES WITHOUT SERIOUS COMORBIDITY WITH BODY MASS INDEX (BMI) OF 36.0 TO 36.9 IN ADULT: Primary | ICD-10-CM

## 2023-05-01 ENCOUNTER — OUTSIDE FACILITY SERVICE (OUTPATIENT)
Dept: GASTROENTEROLOGY | Facility: CLINIC | Age: 53
End: 2023-05-01
Payer: COMMERCIAL

## 2023-05-01 PROCEDURE — 88305 TISSUE EXAM BY PATHOLOGIST: CPT | Performed by: INTERNAL MEDICINE

## 2023-05-01 PROCEDURE — 99152 MOD SED SAME PHYS/QHP 5/>YRS: CPT | Performed by: INTERNAL MEDICINE

## 2023-05-01 PROCEDURE — 45380 COLONOSCOPY AND BIOPSY: CPT | Performed by: INTERNAL MEDICINE

## 2023-05-01 RX ORDER — SEMAGLUTIDE 1.7 MG/.75ML
1.7 INJECTION, SOLUTION SUBCUTANEOUS WEEKLY
Qty: 3 ML | Refills: 2 | Status: SHIPPED | OUTPATIENT
Start: 2023-05-01

## 2023-05-01 NOTE — TELEPHONE ENCOUNTER
From: Erika Alfonso  To: Nannette Haas  Sent: 4/28/2023 8:04 AM EDT  Subject: Wegovy Refill    My next appointment with Nannette isn't until May 23rd and I am out of medicine. Can I get a prescription sent for the next increased dose to [facility] [address] in [place]? Thank you.

## 2023-05-02 ENCOUNTER — LAB REQUISITION (OUTPATIENT)
Dept: LAB | Facility: HOSPITAL | Age: 53
End: 2023-05-02
Payer: COMMERCIAL

## 2023-05-02 DIAGNOSIS — Z12.11 ENCOUNTER FOR SCREENING FOR MALIGNANT NEOPLASM OF COLON: ICD-10-CM

## 2023-05-03 LAB
CYTO UR: NORMAL
LAB AP CASE REPORT: NORMAL
LAB AP CLINICAL INFORMATION: NORMAL
LAB AP DIAGNOSIS COMMENT: NORMAL
PATH REPORT.FINAL DX SPEC: NORMAL
PATH REPORT.GROSS SPEC: NORMAL

## 2023-05-23 ENCOUNTER — OFFICE VISIT (OUTPATIENT)
Dept: BARIATRICS/WEIGHT MGMT | Facility: CLINIC | Age: 53
End: 2023-05-23
Payer: COMMERCIAL

## 2023-05-23 VITALS
WEIGHT: 216.8 LBS | DIASTOLIC BLOOD PRESSURE: 78 MMHG | OXYGEN SATURATION: 100 % | SYSTOLIC BLOOD PRESSURE: 124 MMHG | HEART RATE: 83 BPM | HEIGHT: 66 IN | BODY MASS INDEX: 34.84 KG/M2

## 2023-05-23 DIAGNOSIS — F50.81 BINGE EATING DISORDER: ICD-10-CM

## 2023-05-23 DIAGNOSIS — R53.83 OTHER FATIGUE: ICD-10-CM

## 2023-05-23 DIAGNOSIS — E66.09 CLASS 1 OBESITY DUE TO EXCESS CALORIES WITHOUT SERIOUS COMORBIDITY WITH BODY MASS INDEX (BMI) OF 34.0 TO 34.9 IN ADULT: Primary | ICD-10-CM

## 2023-05-23 PROCEDURE — 99213 OFFICE O/P EST LOW 20 MIN: CPT | Performed by: NURSE PRACTITIONER

## 2023-05-23 RX ORDER — SEMAGLUTIDE 2.4 MG/.75ML
2.4 INJECTION, SOLUTION SUBCUTANEOUS WEEKLY
Qty: 3 ML | Refills: 2 | Status: SHIPPED | OUTPATIENT
Start: 2023-05-23

## 2023-05-23 RX ORDER — CYANOCOBALAMIN 1000 UG/ML
1000 INJECTION, SOLUTION INTRAMUSCULAR; SUBCUTANEOUS ONCE
Status: SHIPPED | OUTPATIENT
Start: 2023-05-23

## 2023-05-23 RX ORDER — LINACLOTIDE 72 UG/1
1 CAPSULE, GELATIN COATED ORAL DAILY
COMMUNITY
Start: 2023-05-01

## 2023-05-23 RX ORDER — MESALAMINE 1000 MG/1
SUPPOSITORY RECTAL
COMMUNITY
Start: 2023-05-01

## 2023-05-23 NOTE — ASSESSMENT & PLAN NOTE
Patient's (Body mass index is 34.99 kg/m².) indicates that they are obese (BMI >30) with health conditions that include dyslipidemias and ADHD, binge eating . Weight is improving with treatment. BMI  is above average; BMI management plan is completed. We discussed low calorie, low carb based diet program, portion control, increasing exercise, management of depression/anxiety/stress to control compensatory eating and pharmacologic options including wegovy.    I have instructed the patient to continue with pursuit of medical weight loss as a part of this program. Patient does meet criteria for use of anorectics at this time as BMI >30  and is not at treatment goal.     Continue nutritional focus and work towards new exercise FITT goal of:   The current plan for this month includes: continue current exercise efforts and continue nutrition focus. Discussed multiple factors likely affecting the scale- body composition changes typically happen without the scale moving much, she is likely not at the sweet spot with dosing yet (she is having no nausea or significant fullness), she has been doing a highly processed diet for several months and is getting burnt out, and she has struggled with constipation until recently when linzess was started. Today, increase wegovy, change diet- trade out 2-3 optavia snacks with real food snacks, continue exercise efforts including resistance training, and increase wegovy. Treatment goal 175lb.

## 2023-05-23 NOTE — PROGRESS NOTES
[facility]  [address]  [place] [address]     Office Note      Date: 2023  Patient Name: Erika Alfonso  MRN: [id number]  : 1970  Subjective  Subjective     Chief Complaint  Obesity Management follow-up          Erika Alfonso presents to [facility] for obesity management.   Patient is unsure with weight loss progress. Appetite is well controlled, no more sugar cravings at this time, no binge tendencies. Reports no side effects of prescribed medications today. Started linzess with GI since last visit, bowel movements are now more regular and easier. The patient is taking multivitamin and is taking fish oil. The patient is not using a food journal, continues to do 5 optavia recharge supplements and 1 real food meal (protein and veggies) daily. Getting burnt out on chicken and fish.     The patient is exercising- walking 3-4 miles a day 3-4 times a week on lunch break and on the weekend. Also doing exercise bands and light hand weights. Also doing 100 squat challenge at work.  FITT score of:    Frequency Intensity Time Strength Training   []   0, none []   0 []   0 []   0   []   1 (1-2x/week) [x]   1 (light) []   1 (<10 min) []   1 (1x/week)   [x]   2 (3-5x/week) []   2 (moderate) []   2 (10-20 min) [x]   2 (2x/week)   []   3 (daily) []   3 (moderately hard)  []   4 (very hard) []   3 (20-30 min)  [x]   4 (>30 min) []   3 (3-4x/week)     Review of Systems   Constitutional: Negative for appetite change and fatigue.   Eyes: Negative for blurred vision, double vision and visual disturbance.   Cardiovascular: Negative for chest pain and palpitations.   Gastrointestinal: Negative for abdominal pain, constipation, diarrhea, nausea, vomiting and GERD.   Endocrine: Negative for polydipsia, polyphagia and polyuria.   Musculoskeletal: Negative for arthralgias, back pain and myalgias.   Neurological: Negative for dizziness, tremors,  "light-headedness, headache and memory problem.        Parasthesias negative   Psychiatric/Behavioral: Negative for sleep disturbance, depressed mood and stress. The patient is not nervous/anxious.        Objective   Start weight: 235.5 pounds.    Total Loss lb/%Loss of beginning body weight (BBW): -19lb/-8.28%  Change in weight since last visit: -7.5lb    Body mass index is 34.99 kg/m².   Body composition analysis completed and showed:   %body fat: 49.1  Measurements (in inches)  Waist: 41.5    Vital Signs:   /78   Pulse 83   Ht 167.6 cm (66\")   Wt 98.3 kg (216 lb 12.8 oz)   SpO2 100%   BMI 34.99 kg/m²     Physical Exam   General appears stated age and normal appearance   HEENT PERRLA, EOM intact and conjunctivae normal   Chest/lungs Normal rate, Regular rhythm, Breathing is unlabored and Clear to auscultation bilaterally   Extremities without edema   Neuro Good historian and No focal deficit   Skin Warm, dry, intact   Psych normal behavior, normal thought content and normal concentration     Result Review :                Assessment / Plan        Diagnoses and all orders for this visit:    1. Class 1 obesity due to excess calories without serious comorbidity with body mass index (BMI) of 34.0 to 34.9 in adult (Primary)  Assessment & Plan:  Patient's (Body mass index is 34.99 kg/m².) indicates that they are obese (BMI >30) with health conditions that include dyslipidemias and ADHD, binge eating . Weight is improving with treatment. BMI  is above average; BMI management plan is completed. We discussed low calorie, low carb based diet program, portion control, increasing exercise, management of depression/anxiety/stress to control compensatory eating and pharmacologic options including wegovy.    I have instructed the patient to continue with pursuit of medical weight loss as a part of this program. Patient does meet criteria for use of anorectics at this time as BMI >30  and is not at treatment goal. "     Continue nutritional focus and work towards new exercise FITT goal of:   The current plan for this month includes: continue current exercise efforts and continue nutrition focus. Discussed multiple factors likely affecting the scale- body composition changes typically happen without the scale moving much, she is likely not at the sweet spot with dosing yet (she is having no nausea or significant fullness), she has been doing a highly processed diet for several months and is getting burnt out, and she has struggled with constipation until recently when linzess was started. Today, increase wegovy, change diet- trade out 2-3 optavia snacks with real food snacks, continue exercise efforts including resistance training, and increase wegovy. Treatment goal 175lb.        Orders:  -     Semaglutide-Weight Management (Wegovy) 2.4 MG/0.75ML solution auto-injector; Inject 2.4 mg under the skin into the appropriate area as directed 1 (One) Time Per Week.  Dispense: 3 mL; Refill: 2    2. Binge eating disorder  Assessment & Plan:  Psychological condition is improving with lifestyle modifications and medication.  Continue current treatment regimen.  Regular aerobic exercise.  Psychological condition  will be reassessed at the next regular appointment.      3. Other fatigue  Assessment & Plan:  Thinks it is work/stress related. Wants to try b-12 shot. Discussed this is not evidence based treatment for weight or fatigue but it is a very low risk option and many patients find it helpful for energy.     Orders:  -     cyanocobalamin injection 1,000 mcg        Follow Up   Return in about 6 weeks (around 7/4/2023) for Next scheduled follow up.  Patient was given instructions and counseling regarding her condition or for health maintenance advice. Please see specific information pulled into the AVS if appropriate.     Nannette Haas, APRN  05/23/2023

## 2023-05-23 NOTE — ASSESSMENT & PLAN NOTE
Thinks it is work/stress related. Wants to try b-12 shot. Discussed this is not evidence based treatment for weight or fatigue but it is a very low risk option and many patients find it helpful for energy.    sensation is normal and strength is normal.

## 2023-05-23 NOTE — ASSESSMENT & PLAN NOTE
Psychological condition is improving with lifestyle modifications and medication.  Continue current treatment regimen.  Regular aerobic exercise.  Psychological condition  will be reassessed at the next regular appointment.

## 2023-08-30 ENCOUNTER — TELEMEDICINE (OUTPATIENT)
Dept: BARIATRICS/WEIGHT MGMT | Facility: CLINIC | Age: 53
End: 2023-08-30
Payer: COMMERCIAL

## 2023-08-30 VITALS
TEMPERATURE: 97.6 F | HEIGHT: 66 IN | SYSTOLIC BLOOD PRESSURE: 127 MMHG | OXYGEN SATURATION: 98 % | HEART RATE: 76 BPM | WEIGHT: 208 LBS | DIASTOLIC BLOOD PRESSURE: 62 MMHG | BODY MASS INDEX: 33.43 KG/M2

## 2023-08-30 DIAGNOSIS — K59.09 OTHER CONSTIPATION: ICD-10-CM

## 2023-08-30 DIAGNOSIS — R63.2 POLYPHAGIA: ICD-10-CM

## 2023-08-30 DIAGNOSIS — E66.09 CLASS 1 OBESITY DUE TO EXCESS CALORIES WITHOUT SERIOUS COMORBIDITY WITH BODY MASS INDEX (BMI) OF 33.0 TO 33.9 IN ADULT: Primary | ICD-10-CM

## 2023-08-30 DIAGNOSIS — K21.9 GASTROESOPHAGEAL REFLUX DISEASE, UNSPECIFIED WHETHER ESOPHAGITIS PRESENT: ICD-10-CM

## 2023-08-30 DIAGNOSIS — E55.9 VITAMIN D DEFICIENCY: ICD-10-CM

## 2023-08-30 PROCEDURE — 99214 OFFICE O/P EST MOD 30 MIN: CPT | Performed by: NURSE PRACTITIONER

## 2023-08-30 RX ORDER — ERGOCALCIFEROL 1.25 MG/1
50000 CAPSULE ORAL
Qty: 4 CAPSULE | Refills: 1 | Status: SHIPPED | OUTPATIENT
Start: 2023-08-30

## 2023-08-30 RX ORDER — SEMAGLUTIDE 2.4 MG/.75ML
2.4 INJECTION, SOLUTION SUBCUTANEOUS WEEKLY
Qty: 3 ML | Refills: 2 | Status: SHIPPED | OUTPATIENT
Start: 2023-08-30

## 2023-08-30 RX ORDER — TOPIRAMATE 25 MG/1
TABLET ORAL
Qty: 60 TABLET | Refills: 2 | Status: SHIPPED | OUTPATIENT
Start: 2023-08-30 | End: 2023-10-06

## 2023-08-30 NOTE — ASSESSMENT & PLAN NOTE
Newly identified. Side effect of wegovy (delayed gastric emptying). Counseled on dietary changes- limit meats to early in the day, avoid eating within a few hours of bedtime. OK to use pepcid PRN, peppermint, camomile. Consider omeprazole for continued symptoms. Education provided via [employer]. Reassess next visit.

## 2023-08-30 NOTE — ASSESSMENT & PLAN NOTE
We discussed that adipose tissue sequesters Vitamin D and it often coexists with obesity. BMI 33.57. Continue with prescription dosing. Reassess in 2 months.

## 2023-08-30 NOTE — PROGRESS NOTES
Office Note      Date: 2023  Patient Name: Erika Alfonso  MRN: [id number]  : 1970    This service was conducted via [employer].  Patient is located at her home address.  Provider is located at her work address. The use of a video visit has been reviewed with the patient and informed consent has been obtained.  Subjective  Subjective     Chief Complaint  Obesity Management follow-up    Subjective          Erika Alfonso presents to [facility]NA[facility] via telehealth for obesity management.     Patient is satisfied with weight loss progress. Appetite is well controlled, still getting full faster than she used to on saxenda. Getting hungry often. Reports no side effects of prescribed medications today. Constipation is well managed with linzess.   She is not keeping a food journal.  24 hour recall  B (6am): optavia bar  S: almonds or protein pack  L: salad with leftover 4oz salmon   S: optavia protein shake  D: 4 oz sirloin steak, broccoli, and sweet potato with a little butter  The patient is exercising until two weeks ago she hurt her shoulder carrying a backpack, it is tender and sore, weak. Is working with a , focusing more on lower body. Starting PT tomorrow. She is doing brisk walking about 30 minutes a day five times a week.   She is sleeping well, 8 hours a night. To manage stress she prays.      Review of Systems   Constitutional:  Negative for appetite change and fatigue.   Eyes:  Negative for blurred vision, double vision and visual disturbance.   Cardiovascular:  Negative for chest pain and palpitations.   Gastrointestinal:  Negative for abdominal pain, constipation, diarrhea, nausea, vomiting and GERD.   Endocrine: Negative for polydipsia, polyphagia and polyuria.   Musculoskeletal:  Negative for arthralgias, back pain and myalgias.   Neurological:  Negative for dizziness, tremors, light-headedness, headache and memory problem.        Parasthesias negative  "  Psychiatric/Behavioral:  Negative for sleep disturbance, depressed mood and stress. The patient is not nervous/anxious.        Objective   Body mass index is 33.57 kg/mý.  Start weight: 235.5 pounds.    Total weight loss: 37.5 pounds/-11%  Change in weight since last visit: -2lb    Measurements (in inches)  Waist: 38\"  /62   Pulse 76   Temp 97.6 øF (36.4 øC)   Ht 167.6 cm (66\")   Wt 94.3 kg (208 lb)   SpO2 98%   BMI 33.57 kg/mý       Result Review :                 Assessment and Plan    Diagnoses and all orders for this visit:    1. Class 1 obesity due to excess calories without serious comorbidity with body mass index (BMI) of 33.0 to 33.9 in adult (Primary)  Assessment & Plan:  Patient's (Body mass index is 33.57 kg/mý.) indicates that they are obese (BMI >30) with health conditions that include dyslipidemias, GERD, and binge eating disorder, depression/anxiety  . Weight is improving with treatment. Side effects of treatment include: constipation and gastric reflux. BMI  is above average; BMI management plan is completed. We discussed low calorie, low carb based diet program, portion control, increasing exercise, management of depression/anxiety/stress to control compensatory eating, pharmacologic options including wegovy, topamax, and an angelica-based approach such as [employer] Pal or Lose It.     I have instructed the patient to continue with pursuit of medical weight loss as a part of this program. Patient does meet criteria for use of anorectics at this time as BMI >30 , is not at treatment goal, and this medication is indicated for LONG TERM use for management of obesity. Prescriptions for weight management include wegovy and topamax.     Continue nutritional focus and work towards new exercise FITT goal of: 2-2-4-2. Working with PT for shoulder injury.   The current plan for this month includes:   - Continue current exercise efforts  - Continue nutrition focus  - Continue to prioritize protein, " fiber, and hydration.   - Add topamax for increased hunger between meals. Reassess next visit.   - Treatment goal 175lb.       Orders:  -     Semaglutide-Weight Management (Wegovy) 2.4 MG/0.75ML solution auto-injector; Inject 2.4 mg under the skin into the appropriate area as directed 1 (One) Time Per Week.  Dispense: 3 mL; Refill: 2  -     topiramate (Topamax) 25 MG tablet; Take 1 tablet by mouth every night at bedtime for 7 days, THEN 2 tablets every night at bedtime for 30 days. Take one daily at bedtime for a week then increase to 2 daily at bedtime  Dispense: 60 tablet; Refill: 2    2. Vitamin D deficiency  Assessment & Plan:  We discussed that adipose tissue sequesters Vitamin D and it often coexists with obesity. BMI 33.57. Continue with prescription dosing. Reassess in 2 months.        Orders:  -     vitamin D (ERGOCALCIFEROL) 1.25 MG (61360 UT) capsule capsule; Take 1 capsule by mouth Every 7 (Seven) Days.  Dispense: 4 capsule; Refill: 1    3. Polyphagia  -     topiramate (Topamax) 25 MG tablet; Take 1 tablet by mouth every night at bedtime for 7 days, THEN 2 tablets every night at bedtime for 30 days. Take one daily at bedtime for a week then increase to 2 daily at bedtime  Dispense: 60 tablet; Refill: 2    4. Gastroesophageal reflux disease, unspecified whether esophagitis present  Assessment & Plan:  Newly identified. Side effect of wegovy (delayed gastric emptying). Counseled on dietary changes- limit meats to early in the day, avoid eating within a few hours of bedtime. OK to use pepcid PRN, peppermint, camomile. Consider omeprazole for continued symptoms. Education provided via [employer]. Reassess next visit.       5. Other constipation  Assessment & Plan:  Side effect of wegovy. Controlled with linzess and diet and regular physical activity.            Follow Up   Return in about 1 month (around 9/30/2023) for Next scheduled follow up.  Patient was given instructions and counseling regarding her  condition or for health maintenance advice. Please see specific information pulled into the AVS if appropriate.     CYNTHIA Mcwilliams

## 2023-08-30 NOTE — ASSESSMENT & PLAN NOTE
Patient's (Body mass index is 33.57 kg/mý.) indicates that they are obese (BMI >30) with health conditions that include dyslipidemias, GERD, and binge eating disorder, depression/anxiety  . Weight is improving with treatment. Side effects of treatment include: constipation and gastric reflux. BMI  is above average; BMI management plan is completed. We discussed low calorie, low carb based diet program, portion control, increasing exercise, management of depression/anxiety/stress to control compensatory eating, pharmacologic options including wegovy, topamax, and an angelica-based approach such as [employer] Pal or Lose It.     I have instructed the patient to continue with pursuit of medical weight loss as a part of this program. Patient does meet criteria for use of anorectics at this time as BMI >30 , is not at treatment goal, and this medication is indicated for LONG TERM use for management of obesity. Prescriptions for weight management include wegovy and topamax.     Continue nutritional focus and work towards new exercise FITT goal of: 2-2-4-2. Working with PT for shoulder injury.   The current plan for this month includes:   - Continue current exercise efforts  - Continue nutrition focus  - Continue to prioritize protein, fiber, and hydration.   - Add topamax for increased hunger between meals. Reassess next visit.   - Treatment goal 175lb.

## 2023-09-05 ENCOUNTER — TELEPHONE (OUTPATIENT)
Dept: GASTROENTEROLOGY | Facility: CLINIC | Age: 53
End: 2023-09-05

## 2023-09-05 NOTE — TELEPHONE ENCOUNTER
Caller: Erika Alfonso    Relationship to patient: Self    Best call back number: [phone]     Chief complaint: RESCHEDULE APPT     Type of visit: F/U     Requested date: NOT A SPECIFIC DATE GIVEN     If rescheduling, when is the original appointment: 10/31    Additional notes: PATIENT IS NEEDING TO RESCHEDULE APPT WITH DR AGUAYO BUT NOT SHOWING ANYTHING AT ALL FOR HIM ON THIS SCHEDULING TEMPLATE. PATIENT IS  INQUIRING IF THE FOLLOW UP VISIT IS NEEDED. PLEASE CALL PATIENT, IF NOT AVAILABLE LEAVE A MESSAGE.

## 2023-09-06 ENCOUNTER — PRIOR AUTHORIZATION (OUTPATIENT)
Dept: BARIATRICS/WEIGHT MGMT | Facility: CLINIC | Age: 53
End: 2023-09-06
Payer: COMMERCIAL

## 2023-10-12 ENCOUNTER — OFFICE VISIT (OUTPATIENT)
Dept: BARIATRICS/WEIGHT MGMT | Facility: CLINIC | Age: 53
End: 2023-10-12
Payer: COMMERCIAL

## 2023-10-12 VITALS
HEART RATE: 78 BPM | BODY MASS INDEX: 32.33 KG/M2 | OXYGEN SATURATION: 100 % | DIASTOLIC BLOOD PRESSURE: 74 MMHG | WEIGHT: 201.2 LBS | SYSTOLIC BLOOD PRESSURE: 126 MMHG | HEIGHT: 66 IN

## 2023-10-12 DIAGNOSIS — R63.2 POLYPHAGIA: ICD-10-CM

## 2023-10-12 DIAGNOSIS — E66.09 CLASS 1 OBESITY DUE TO EXCESS CALORIES WITHOUT SERIOUS COMORBIDITY WITH BODY MASS INDEX (BMI) OF 32.0 TO 32.9 IN ADULT: Primary | ICD-10-CM

## 2023-10-12 DIAGNOSIS — K21.9 GASTROESOPHAGEAL REFLUX DISEASE, UNSPECIFIED WHETHER ESOPHAGITIS PRESENT: ICD-10-CM

## 2023-10-12 DIAGNOSIS — E03.9 HYPOTHYROIDISM, UNSPECIFIED TYPE: ICD-10-CM

## 2023-10-12 RX ORDER — LEVOTHYROXINE SODIUM 0.1 MG/1
1 TABLET ORAL DAILY
COMMUNITY
Start: 2023-07-25

## 2023-10-12 RX ORDER — TOPIRAMATE 25 MG/1
TABLET ORAL
Start: 2023-10-12

## 2023-10-12 RX ORDER — VIT C/B6/B5/MAGNESIUM/HERB 173 50-5-6-5MG
1 CAPSULE ORAL
Start: 2023-10-12

## 2023-10-12 RX ORDER — METFORMIN HYDROCHLORIDE 500 MG/1
TABLET, EXTENDED RELEASE ORAL
COMMUNITY
Start: 2023-09-10 | End: 2023-10-12

## 2023-10-12 RX ORDER — SEMAGLUTIDE 2.4 MG/.75ML
2.4 INJECTION, SOLUTION SUBCUTANEOUS WEEKLY
Qty: 3 ML | Refills: 2 | Status: SHIPPED | OUTPATIENT
Start: 2023-10-12

## 2023-10-12 RX ADMIN — CYANOCOBALAMIN 1000 MCG: 1000 INJECTION, SOLUTION INTRAMUSCULAR; SUBCUTANEOUS at 08:43

## 2023-10-12 NOTE — PROGRESS NOTES
[facility]  [address]  [place] [address]     Office Note      Date: 10/12/2023  Patient Name: Erika Alfonso  MRN: [id number]  : 1970  Subjective  Subjective     Chief Complaint  Obesity Management follow-up          Erika Alfonso presents to [facility] for obesity management.   Patient is unsure with weight loss progress. She is checking her weight daily. Appetite is moderately controlled. Topamax has helped with cravings. Reports continued reflux with wegovy. Has cut out coffee, soda, alcohol, and chocolate and reflux has improved some. Altoids help. The patient is taking multivitamin and is taking fish oil. The patient is not using a food journal.  Following a 2/3 diet plan with optavia- Uses Optavia products twice a day and real foods 3 times a day.  24 hour recall:  B: optavia   S: grapes and cheese  L: turkey and cheese on rye with a little mustard and celery sticks  S: Optavia bar  D: chicken parmesan over zucchini noodles  When dining out tries to choose salad with vinegrette and grilled chicken and broccoli  Occasionally has unsweet tea, otherwise only consumes water. Urine is pale yellow.   Constipation is controlled with linzess daily.   The patient is exercising with a FITT score of:    Frequency Intensity Time Strength Training   []   0, none []   0 []   0 []   0   []   1 (1-2x/week) []   1 (light) []   1 (<10 min) []   1 (1x/week)   [x]   2 (3-5x/week) [x]   2 (moderate) []   2 (10-20 min) [x]   2 (2x/week)   []   3 (daily) []   3 (moderately hard)  []   4 (very hard) []   3 (20-30 min)  [x]   4 (>30 min) []   3 (3-4x/week)     Review of Systems   Constitutional:  Negative for appetite change and fatigue.   Eyes:  Negative for blurred vision, double vision and visual disturbance.   Cardiovascular:  Negative for chest pain and palpitations.   Gastrointestinal:  Negative for abdominal pain, constipation, diarrhea,  "nausea, vomiting and GERD.   Endocrine: Negative for polydipsia, polyphagia and polyuria.   Musculoskeletal:  Negative for arthralgias, back pain and myalgias.   Neurological:  Negative for dizziness, tremors, light-headedness, headache and memory problem.        Parasthesias negative   Psychiatric/Behavioral:  Negative for sleep disturbance, depressed mood and stress. The patient is not nervous/anxious.        Objective   Start weight: 235.5 pounds.    Total Loss lb/%Loss of beginning body weight (BBW): -27.5lb/-11.67%  Change in weight since last visit: -7      Body mass index is 32.47 kg/mý.   Body composition analysis completed and showed:   Body Fat %: 48.5    Measurements (in inches)  Waist Circumference: 39.5      Vital Signs:   /74   Pulse 78   Ht 167.6 cm (66\")   Wt 91.3 kg (201 lb 3.2 oz)   SpO2 100%   BMI 32.47 kg/mý     Physical Exam   General appears stated age and normal appearance   HEENT PERRLA, EOM intact, and conjunctivae normal   Chest/lungs Normal rate, Regular rhythm, and Breathing is unlabored   Extremities without edema   Neuro Good historian and No focal deficit   Skin Warm, dry, intact   Psych normal behavior, normal thought content, and normal concentration     Result Review :                Assessment / Plan        Diagnoses and all orders for this visit:    1. Class 1 obesity due to excess calories without serious comorbidity with body mass index (BMI) of 32.0 to 32.9 in adult (Primary)  Assessment & Plan:  Patient's (Body mass index is 32.47 kg/mý.) indicates that they are obese (BMI >30) with health conditions that include dyslipidemias, GERD, and BED  . Weight is improving with treatment. BMI  is above average; BMI management plan is completed. We discussed low calorie, low carb based diet program, portion control, increasing exercise, and pharmacologic options including wegovy, topamax .    I have instructed the patient to continue with pursuit of medical weight loss as a " part of this program. Patient does meet criteria for use of anorectics at this time as BMI >30  and is not at treatment goal.     The current plan for this month includes:   - Continue to prioritize protein, fiber, and hydration.   - Continue current medications for obesity: wegovy and metformin.   - Treatment goal 175lb.        Orders:  -     Semaglutide-Weight Management (Wegovy) 2.4 MG/0.75ML solution auto-injector; Inject 2.4 mg under the skin into the appropriate area as directed 1 (One) Time Per Week.  Dispense: 3 mL; Refill: 2  -     topiramate (Topamax) 25 MG tablet; Take two tablets by mouth daily at bedtime.    2. Polyphagia  Assessment & Plan:  Improving with topamax.     Orders:  -     topiramate (Topamax) 25 MG tablet; Take two tablets by mouth daily at bedtime.    3. Gastroesophageal reflux disease, unspecified whether esophagitis present  Assessment & Plan:  Improving with dietary changes (eliminated coffee, alcohol, soda, chocolate). Altoids help. Discussed a recent study comparing turmeric to omeprazole with similar outcomes.     Orders:  -     Turmeric 500 MG capsule; Take 1 capsule by mouth Daily With Breakfast.    4. Hypothyroidism, unspecified type  Assessment & Plan:  Asymptomatic. Levothyroxine was decreased to 88mcg based on recent labwork through [facility]. She will send a copy of the lab results through [facility].               Follow Up   Return in about 1 month (around 11/12/2023) for Next scheduled follow up.  Patient was given instructions and counseling regarding her condition or for health maintenance advice. Please see specific information pulled into the AVS if appropriate.     Nannette Haas, APRN  10/12/2023

## 2023-10-12 NOTE — ASSESSMENT & PLAN NOTE
Improving with dietary changes (eliminated coffee, alcohol, soda, chocolate). Altoids help. Discussed a recent study comparing turmeric to omeprazole with similar outcomes.

## 2023-10-12 NOTE — ASSESSMENT & PLAN NOTE
Patient's (Body mass index is 32.47 kg/mý.) indicates that they are obese (BMI >30) with health conditions that include dyslipidemias, GERD, and BED  . Weight is improving with treatment. BMI  is above average; BMI management plan is completed. We discussed low calorie, low carb based diet program, portion control, increasing exercise, and pharmacologic options including wegovy, topamax .    I have instructed the patient to continue with pursuit of medical weight loss as a part of this program. Patient does meet criteria for use of anorectics at this time as BMI >30  and is not at treatment goal.     The current plan for this month includes:   - Continue to prioritize protein, fiber, and hydration.   - Continue current medications for obesity: wegovy and metformin.   - Treatment goal 175lb.

## 2023-10-12 NOTE — ASSESSMENT & PLAN NOTE
Asymptomatic. Levothyroxine was decreased to 88mcg based on recent labwork through [facility]. She will send a copy of the lab results through [facility].

## 2023-10-24 RX ORDER — LINACLOTIDE 72 UG/1
72 CAPSULE, GELATIN COATED ORAL DAILY
Qty: 90 CAPSULE | Refills: 3 | Status: SHIPPED | OUTPATIENT
Start: 2023-10-24

## 2023-10-30 ENCOUNTER — OFFICE VISIT (OUTPATIENT)
Dept: INTERNAL MEDICINE | Facility: CLINIC | Age: 53
End: 2023-10-30
Payer: COMMERCIAL

## 2023-10-30 VITALS
SYSTOLIC BLOOD PRESSURE: 124 MMHG | DIASTOLIC BLOOD PRESSURE: 82 MMHG | TEMPERATURE: 98 F | HEART RATE: 77 BPM | WEIGHT: 205 LBS | OXYGEN SATURATION: 99 % | BODY MASS INDEX: 32.95 KG/M2 | HEIGHT: 66 IN

## 2023-10-30 DIAGNOSIS — E66.09 CLASS 1 OBESITY DUE TO EXCESS CALORIES WITHOUT SERIOUS COMORBIDITY WITH BODY MASS INDEX (BMI) OF 33.0 TO 33.9 IN ADULT: ICD-10-CM

## 2023-10-30 DIAGNOSIS — Z13.0 SCREENING FOR ENDOCRINE, METABOLIC AND IMMUNITY DISORDER: ICD-10-CM

## 2023-10-30 DIAGNOSIS — Z13.29 SCREENING FOR ENDOCRINE, METABOLIC AND IMMUNITY DISORDER: ICD-10-CM

## 2023-10-30 DIAGNOSIS — Z13.0 SCREENING FOR DISORDER OF BLOOD AND BLOOD-FORMING ORGANS: ICD-10-CM

## 2023-10-30 DIAGNOSIS — E78.00 HYPERCHOLESTEREMIA: ICD-10-CM

## 2023-10-30 DIAGNOSIS — Z00.00 ENCOUNTER FOR ANNUAL PHYSICAL EXAMINATION EXCLUDING GYNECOLOGICAL EXAMINATION IN A PATIENT OLDER THAN 17 YEARS: ICD-10-CM

## 2023-10-30 DIAGNOSIS — Z13.228 SCREENING FOR ENDOCRINE, METABOLIC AND IMMUNITY DISORDER: ICD-10-CM

## 2023-10-30 DIAGNOSIS — R20.0 NUMBNESS AND TINGLING OF BOTH FEET: ICD-10-CM

## 2023-10-30 DIAGNOSIS — E03.9 HYPOTHYROIDISM, UNSPECIFIED TYPE: ICD-10-CM

## 2023-10-30 DIAGNOSIS — E55.9 VITAMIN D DEFICIENCY: ICD-10-CM

## 2023-10-30 DIAGNOSIS — R73.9 HYPERGLYCEMIA: ICD-10-CM

## 2023-10-30 DIAGNOSIS — Z87.42 HISTORY OF PCOS: ICD-10-CM

## 2023-10-30 DIAGNOSIS — R20.2 NUMBNESS AND TINGLING OF BOTH FEET: ICD-10-CM

## 2023-10-30 DIAGNOSIS — Z76.89 ENCOUNTER TO ESTABLISH CARE: Primary | ICD-10-CM

## 2023-10-30 DIAGNOSIS — Z23 NEED FOR TDAP VACCINATION: ICD-10-CM

## 2023-10-30 NOTE — PROGRESS NOTES
Date: 10/30/2023    Name: Erika Alfonso  : 1970    Chief Complaint:   Chief Complaint   Patient presents with    [facility]    Annual Exam       HPI:  Erika Alfonso is a 53 y.o. female presents to establish care, obtain annual physical.  Has not had an annual physical in over a year.     Hypothyroid. Glucose has been elevated in the past, told she was prediabetic at one time.  Hair was coming out, dry skin, was exhausted when first diagnosed.  Continues to feel tired, would be worn out walking up stairs to clinic today. Does not snore. Took Nature-Throid at first.  Transitioned to levothyroxine last year.  Feels its been working a bit better. Continues to have dry skin. US of neck/soft tissue last year; thyroid normal. Denies weight change, temperature intolerance, hair/nail changes, bowel habit changes, palpitations, anxiety, sore throat, hoarseness.     Left tonsillar abscess last year.  Admitted to [facility] on 2022 for IV antibiotics after oral antibiotics failed.  She was discharged on 12/3/2022.  Follow-up with ENT normal.    Following Weight loss clinic.  Eats very well.  Struggles with weight.  States she looks at food and gains weight.  Walks, weight lifts.  Following [employer] at this time.  She is also taking Topamax, which helps with sleep as well as decreasing appetite.    Bipolar disorder, ADHD.  Follows psychiatry and goes to counseling.  Medications working well, mood well managed.  Taking Adderall, Abilify, bupropion, BuSpar, fluoxetine, hydroxyzine, lamotrigine.  Has been prescribed trazodone in the past, not currently taking it due to taking Topamax.    Chronic constipation.  GERD.  Was taking nexium, now taking turmeric, tums due to concerns regarding bone loss and renal function. Plans to discuss with Dr Davis, GI, tomorrow.  She is taking Linzess for constipation, working well.    Her mother passed away in . Mother had COLE, renal disease, heart disease,  neuropathy.  She worries she may have inherited some of these diseases. Both feet feel tingly at times, unrelated to positioning, activity.  She does not have consistent low back pain.        History:  LMP: No LMP recorded. Patient has had a hysterectomy.  Menopause at 47 years  Menarche: 12 years old  Sexual activity: monogamous, heterosexual relationship x 35 years   Last pap date: 2023  Abnormal pap? yes, dysplasia w/conization.  Repeat testing was normal.   : 3  Para: 0, 2 adopted children    Do you take any herbs or supplements that were not prescribed by a doctor? yes, vitamin B, MVN, fish oil      Health Habits:  Dental Exam. up to date  Eye Exam. up to date, wears glasses or contacts  Exercise: 4 times/week.  Current exercise activities include:  walking during pretty weather, weight lifting   Current diet: healthy, well balanced     History:    Past Medical History:   Diagnosis Date    Anxiety     Bell's palsy     Bipolar disorder     Constipation     Depression     Disease of thyroid gland     Esophageal reflux 2023    PCOS (polycystic ovarian syndrome)        Past Surgical History:   Procedure Laterality Date    APPENDECTOMY N/A 10/03/2021    Procedure: APPENDECTOMY LAPAROSCOPIC;  Surgeon: Nick Perales MD;  Location: [facility];  Service: General;  Laterality: N/A;    BREAST BIOPSY  right    BREAST SURGERY      ENDOMETRIAL ABLATION      LAPAROSCOPIC TUBAL LIGATION      OVARIAN CYST REMOVAL         Family History   Problem Relation Age of Onset    Depression Mother     Hypertension Mother     Obesity Mother     COPD Mother     Heart disease Mother     Kidney disease Mother     Mental illness Mother     Miscarriages / Stillbirths Mother     Cancer Father     Depression Father     Hypertension Father     Arthritis Father     Hearing loss Father     Cancer Sister     Depression Sister     Obesity Sister     Hypertension Maternal Grandfather     Cancer Paternal Grandfather     Depression  Paternal Grandfather     Hypertension Paternal Grandfather     Obesity Paternal Grandfather     Arthritis Paternal Grandfather     Diabetes Paternal Grandfather     Cancer Maternal Aunt     Depression Maternal Aunt     Hypertension Maternal Aunt     Obesity Maternal Aunt     Asthma Sister     Cancer Sister     Depression Sister     Miscarriages / Stillbirths Sister        Social History     Socioeconomic History    Marital status:    Tobacco Use    Smoking status: Never    Smokeless tobacco: Never   Substance and Sexual Activity    Alcohol use: Yes     Alcohol/week: 4.0 standard drinks of alcohol     Types: 4 Cans of beer per week     Comment: Occasionally    Drug use: No    Sexual activity: Yes     Partners: Male     Birth control/protection: Post-menopausal, None       No Known Allergies      Current Outpatient Medications:     amphetamine-dextroamphetamine (ADDERALL) 20 MG tablet, Take 1 tablet by mouth 2 (Two) Times a Day., Disp: , Rfl:     ARIPiprazole (ABILIFY) 10 MG tablet, Take 1 tablet by mouth Daily., Disp: , Rfl:     buPROPion SR (WELLBUTRIN SR) 150 MG 12 hr tablet, Take 1 tablet by mouth 2 (Two) Times a Day., Disp: , Rfl:     busPIRone (BUSPAR) 7.5 MG tablet, Take 1 tablet by mouth every night at bedtime., Disp: , Rfl:     FLUoxetine (PROzac) 20 MG capsule, Take 1 capsule by mouth Daily., Disp: , Rfl:     FLUoxetine (PROzac) 40 MG capsule, Take 1 capsule by mouth Every Morning., Disp: , Rfl:     hydrOXYzine (ATARAX) 50 MG tablet, Take 1 tablet by mouth Every 8 (Eight) Hours As Needed., Disp: , Rfl:     lamoTRIgine (LaMICtal) 200 MG tablet, Take 1 tablet by mouth Daily., Disp: , Rfl:     levothyroxine (SYNTHROID, LEVOTHROID) 100 MCG tablet, Take 1 tablet by mouth Daily., Disp: , Rfl:     linaclotide (Linzess) 72 MCG capsule capsule, TAKE 1 CAPSULE BY MOUTH EVERY DAY, Disp: 90 capsule, Rfl: 3    mesalamine (CANASA) 1000 MG suppository, UNWRAP AND INSERT 1 DAILY AT BEDTIME, Disp: , Rfl:      "multivitamin with minerals (Multivitamin Adults) tablet tablet, Take 1 tablet by mouth Daily., Disp: 30 tablet, Rfl: 2    Omega-3 Fatty Acids (fish oil) 1000 MG capsule capsule, Take 1 capsule by mouth 2 (Two) Times a Day With Meals., Disp: , Rfl:     Semaglutide-Weight Management (Wegovy) 2.4 MG/0.75ML solution auto-injector, Inject 2.4 mg under the skin into the appropriate area as directed 1 (One) Time Per Week., Disp: 3 mL, Rfl: 2    topiramate (Topamax) 25 MG tablet, Take two tablets by mouth daily at bedtime., Disp: , Rfl:     traZODone (DESYREL) 50 MG tablet, Take 1 tablet by mouth Every Night., Disp: , Rfl:     Turmeric 500 MG capsule, Take 1 capsule by mouth Daily With Breakfast., Disp: , Rfl:     vitamin D (ERGOCALCIFEROL) 1.25 MG (69137 UT) capsule capsule, Take 1 capsule by mouth Every 7 (Seven) Days., Disp: 4 capsule, Rfl: 1    ROS:  Review of Systems   All other systems reviewed and are negative.      VS:  Vitals:    10/30/23 0856   BP: 124/82   Pulse: 77   Temp: 98 °F (36.7 °C)   SpO2: 99%   Weight: 93 kg (205 lb)   Height: 167.6 cm (65.98\")     Body mass index is 33.1 kg/m².    PE:  Physical Exam  Constitutional:       Appearance: She is well-developed. She is obese. She is not ill-appearing.   HENT:      Head: Normocephalic.      Right Ear: Tympanic membrane, ear canal and external ear normal.      Left Ear: Tympanic membrane, ear canal and external ear normal.      Nose: Nose normal.      Mouth/Throat:      Mouth: Mucous membranes are moist.      Pharynx: Oropharynx is clear. Uvula midline.   Eyes:      Extraocular Movements: Extraocular movements intact.      Conjunctiva/sclera: Conjunctivae normal.      Pupils: Pupils are equal, round, and reactive to light.      Comments: Right eye very slightly smaller than left.     Neck:      Thyroid: No thyromegaly.      Comments: Scar tissue palpable on left neck.    Cardiovascular:      Rate and Rhythm: Normal rate and regular rhythm.      Pulses:          "  Radial pulses are 2+ on the right side and 2+ on the left side.        Dorsalis pedis pulses are 2+ on the right side and 2+ on the left side.      Heart sounds: Normal heart sounds.   Pulmonary:      Effort: Pulmonary effort is normal.      Breath sounds: Normal breath sounds.   Abdominal:      General: Bowel sounds are normal.      Palpations: Abdomen is soft.      Tenderness: There is no abdominal tenderness.   Musculoskeletal:         General: No tenderness or deformity. Normal range of motion.      Cervical back: Full passive range of motion without pain, normal range of motion and neck supple.      Right lower leg: No edema.      Left lower leg: No edema.   Lymphadenopathy:      Cervical: No cervical adenopathy.   Skin:     General: Skin is warm.      Capillary Refill: Capillary refill takes less than 2 seconds.   Neurological:      Mental Status: She is alert and oriented to person, place, and time.      Sensory: No sensory deficit.      Coordination: Coordination normal.      Gait: Gait normal.      Comments: CN II-XII normal   Psychiatric:         Attention and Perception: Attention normal.         Mood and Affect: Mood and affect normal.         Speech: Speech normal.         Behavior: Behavior normal.         Thought Content: Thought content normal.         Assessment/Plan:     1. Healthy female exam.    2. Patient Counseling: Including but not Limited to the following, when appropriate:  --Nutrition: Stressed importance of moderation in sodium/caffeine intake, saturated fat and cholesterol, caloric balance, sufficient intake of fresh fruits, vegetables, fiber, calcium, iron  --Exercise: Stressed the importance of regular exercise.   --Substance Abuse: Discussed cessation/primary prevention of tobacco, alcohol, or other drug use; driving or other dangerous activities under the influence; availability of treatment for abuse, as indicated based on social history.    --Sexuality: Discussed sexually  transmitted diseases, partner selection, use of condoms, avoidance of unintended pregnancy  and contraceptive alternatives.   --Injury prevention: Discussed safety belts, safety helmets, smoke detector, smoking near bedding or upholstery.   --Dental health: Discussed importance of regular tooth brushing, flossing, and dental visits.  --Immunizations reviewed.  --Discussed benefits of colon cancer screening.  3. Discussed the patient's BMI with her.  The BMI is above average; BMI management plan is completed  4. Return in about 1 year (around 10/30/2024) for Annual.  5. Age-appropriate Screening Scheduled  6. There are no Patient Instructions on file for this visit.    Diagnoses and all orders for this visit:    1. Encounter to establish care (Primary)    2. Encounter for annual physical examination excluding gynecological examination in a patient older than 17 years    3. Hypothyroidism, unspecified type  -     T4, Free  -     TSH  -     T3, Free  - Continue levothyroxine as prescribed   - Will hold Vitamin B supplement for 5 days prior to having labs drawn.      4. Hypercholesteremia  -     Lipid Panel    5. History of PCOS        - Lipid panel, Hemoglobin A1c     6. Vitamin D deficiency  -     Vitamin D,25-Hydroxy    7. Screening for endocrine, metabolic and immunity disorder  -     Comprehensive Metabolic Panel    8. Screening for disorder of blood and blood-forming organs  -     CBC & Differential    9. Hyperglycemia  -     Hemoglobin A1c    10. Numbness and tingling of both feet  -     Vitamin B12  - If normal, will consider EMG due to FH of neuropathy    11. Need for Tdap vaccination  -     Tdap Vaccine => 8yo IM (BOOSTRIX)    12. Class 1 obesity due to excess calories without serious comorbidity with body mass index (BMI) of 33.0 to 33.9 in adult        -  BMI is >= 30 and <35. (Class 1 Obesity). The following options were offered after discussion;: continue healthy diet, follow up with bariatric clinic,  continue medication as prescribed.  Consider adding cardio to workouts.            Return in about 1 year (around 10/30/2024) for Annual.

## 2023-11-04 LAB
25(OH)D3+25(OH)D2 SERPL-MCNC: 60.1 NG/ML (ref 30–100)
ALBUMIN SERPL-MCNC: 4.8 G/DL (ref 3.5–5.2)
ALBUMIN/GLOB SERPL: 2.3 G/DL
ALP SERPL-CCNC: 120 U/L (ref 39–117)
ALT SERPL-CCNC: 14 U/L (ref 1–33)
AST SERPL-CCNC: 19 U/L (ref 1–32)
BASOPHILS # BLD AUTO: 0.04 10*3/MM3 (ref 0–0.2)
BASOPHILS NFR BLD AUTO: 0.8 % (ref 0–1.5)
BILIRUB SERPL-MCNC: 0.5 MG/DL (ref 0–1.2)
BUN SERPL-MCNC: 11 MG/DL (ref 6–20)
BUN/CREAT SERPL: 10.4 (ref 7–25)
CALCIUM SERPL-MCNC: 10.1 MG/DL (ref 8.6–10.5)
CHLORIDE SERPL-SCNC: 103 MMOL/L (ref 98–107)
CHOLEST SERPL-MCNC: 198 MG/DL (ref 0–200)
CO2 SERPL-SCNC: 25.3 MMOL/L (ref 22–29)
CREAT SERPL-MCNC: 1.06 MG/DL (ref 0.57–1)
EGFRCR SERPLBLD CKD-EPI 2021: 62.9 ML/MIN/1.73
EOSINOPHIL # BLD AUTO: 0.39 10*3/MM3 (ref 0–0.4)
EOSINOPHIL NFR BLD AUTO: 8.2 % (ref 0.3–6.2)
ERYTHROCYTE [DISTWIDTH] IN BLOOD BY AUTOMATED COUNT: 12.6 % (ref 12.3–15.4)
GLOBULIN SER CALC-MCNC: 2.1 GM/DL
GLUCOSE SERPL-MCNC: 79 MG/DL (ref 65–99)
HBA1C MFR BLD: 5 % (ref 4.8–5.6)
HCT VFR BLD AUTO: 40.6 % (ref 34–46.6)
HDLC SERPL-MCNC: 78 MG/DL (ref 40–60)
HGB BLD-MCNC: 13.9 G/DL (ref 12–15.9)
IMM GRANULOCYTES # BLD AUTO: 0.01 10*3/MM3 (ref 0–0.05)
IMM GRANULOCYTES NFR BLD AUTO: 0.2 % (ref 0–0.5)
LDLC SERPL CALC-MCNC: 105 MG/DL (ref 0–100)
LYMPHOCYTES # BLD AUTO: 1.27 10*3/MM3 (ref 0.7–3.1)
LYMPHOCYTES NFR BLD AUTO: 26.6 % (ref 19.6–45.3)
MCH RBC QN AUTO: 29.1 PG (ref 26.6–33)
MCHC RBC AUTO-ENTMCNC: 34.2 G/DL (ref 31.5–35.7)
MCV RBC AUTO: 85.1 FL (ref 79–97)
MONOCYTES # BLD AUTO: 0.4 10*3/MM3 (ref 0.1–0.9)
MONOCYTES NFR BLD AUTO: 8.4 % (ref 5–12)
NEUTROPHILS # BLD AUTO: 2.66 10*3/MM3 (ref 1.7–7)
NEUTROPHILS NFR BLD AUTO: 55.8 % (ref 42.7–76)
NRBC BLD AUTO-RTO: 0 /100 WBC (ref 0–0.2)
PLATELET # BLD AUTO: 293 10*3/MM3 (ref 140–450)
POTASSIUM SERPL-SCNC: 4.4 MMOL/L (ref 3.5–5.2)
PROT SERPL-MCNC: 6.9 G/DL (ref 6–8.5)
RBC # BLD AUTO: 4.77 10*6/MM3 (ref 3.77–5.28)
SODIUM SERPL-SCNC: 137 MMOL/L (ref 136–145)
T3FREE SERPL-MCNC: 3.3 PG/ML (ref 2–4.4)
T4 FREE SERPL-MCNC: 1.61 NG/DL (ref 0.93–1.7)
TRIGL SERPL-MCNC: 83 MG/DL (ref 0–150)
TSH SERPL DL<=0.005 MIU/L-ACNC: 0.36 UIU/ML (ref 0.27–4.2)
VIT B12 SERPL-MCNC: 727 PG/ML (ref 211–946)
VLDLC SERPL CALC-MCNC: 15 MG/DL (ref 5–40)
WBC # BLD AUTO: 4.77 10*3/MM3 (ref 3.4–10.8)

## 2023-11-08 ENCOUNTER — TELEMEDICINE (OUTPATIENT)
Dept: BARIATRICS/WEIGHT MGMT | Facility: CLINIC | Age: 53
End: 2023-11-08
Payer: COMMERCIAL

## 2023-11-08 VITALS
BODY MASS INDEX: 31.88 KG/M2 | DIASTOLIC BLOOD PRESSURE: 87 MMHG | OXYGEN SATURATION: 99 % | TEMPERATURE: 97.8 F | SYSTOLIC BLOOD PRESSURE: 128 MMHG | WEIGHT: 198.4 LBS | HEART RATE: 78 BPM | HEIGHT: 66 IN

## 2023-11-08 DIAGNOSIS — E66.09 CLASS 1 OBESITY DUE TO EXCESS CALORIES WITHOUT SERIOUS COMORBIDITY WITH BODY MASS INDEX (BMI) OF 32.0 TO 32.9 IN ADULT: Primary | ICD-10-CM

## 2023-11-08 DIAGNOSIS — R63.2 POLYPHAGIA: ICD-10-CM

## 2023-11-08 PROCEDURE — 99214 OFFICE O/P EST MOD 30 MIN: CPT | Performed by: NURSE PRACTITIONER

## 2023-11-08 RX ORDER — METFORMIN HYDROCHLORIDE 500 MG/1
500 TABLET, EXTENDED RELEASE ORAL 2 TIMES DAILY WITH MEALS
Qty: 60 TABLET | Refills: 2 | Status: SHIPPED | OUTPATIENT
Start: 2023-11-08

## 2023-11-08 RX ORDER — SEMAGLUTIDE 2.4 MG/.75ML
2.4 INJECTION, SOLUTION SUBCUTANEOUS WEEKLY
Qty: 3 ML | Refills: 2 | Status: SHIPPED | OUTPATIENT
Start: 2023-11-08

## 2023-11-08 NOTE — PROGRESS NOTES
Office Note      Date: 2023  Patient Name: Erika Alfonso  MRN: [id number]  : 1970    This service was conducted via [employer].  Patient is located at her home address.  Provider is located at her work address. The use of a video visit has been reviewed with the patient and informed consent has been obtained.  Subjective  Subjective     Chief Complaint  Obesity Management follow-up    Subjective          Erika Alfonso presents to [facility] via telehealth for obesity management.     Patient is satisfied with weight loss progress. Appetite is moderately controlled. Has noticed her hunger is increasing over the last few weeks. Did go to [place] for a week which was challenging as far as food choices. Reports the following side effects with topamax: memory problem, fatigue. Still doing 3 meals prepared and 2 optavia snacks daily. Focused on getting protein.   B: optavia strawberry shake  S: fruit (strawberries with sugar free whipped cream)  L: [employer]ikos yogurt, ham and cheese rolls, almonds, pickles  S: optavia bar  D: broccoli, raul salad, grilled chicken parm  Had difficulty eating while out of town last week.   The patient is not exercising as much this month. She is feeling very exhausted, had labwork with PCP 2 weeks ago and they were normal.   Interviewed for a promotion Monday and she had difficulty finding words.   Has to go back to [place] in December.   GERD: Thinks the turmeric is helping, still taking altoids.   Review of Systems   Constitutional:  Positive for appetite change and fatigue.   Eyes:  Negative for blurred vision, double vision and visual disturbance.   Cardiovascular:  Negative for chest pain and palpitations.   Gastrointestinal:  Negative for abdominal pain, constipation, diarrhea, nausea, vomiting and GERD.   Endocrine: Negative for polydipsia, polyphagia and polyuria.   Musculoskeletal:  Negative for arthralgias, back pain and myalgias.  "  Neurological:  Positive for memory problem. Negative for dizziness, tremors, light-headedness and headache.        Parasthesias negative   Psychiatric/Behavioral:  Negative for sleep disturbance, depressed mood and stress. The patient is not nervous/anxious.      Objective   Start weight: 235.5lb pounds.    Total weight loss: 37.5 pounds/-15.9%  Change in weight since last visit: -3lb    Body mass index is 32.04 kg/m².   Measurements (in inches)     /87   Pulse 78   Temp 97.8 °F (36.6 °C)   Ht 167.6 cm (65.98\")   Wt 90 kg (198 lb 6.4 oz)   SpO2 99%   BMI 32.04 kg/m²       Result Review :                 Assessment and Plan    Diagnoses and all orders for this visit:    1. Class 1 obesity due to excess calories without serious comorbidity with body mass index (BMI) of 32.0 to 32.9 in adult (Primary)  Assessment & Plan:  Patient's (Body mass index is 32.04 kg/m².) indicates that they are obese (BMI >30) with health conditions that include dyslipidemias, GERD, and anxiety/depression  . Weight is improving with treatment. BMI  is above average; BMI management plan is completed. We discussed low calorie, low carb based diet program, portion control, increasing exercise, management of depression/anxiety/stress to control compensatory eating, and pharmacologic options including wegovy, metformin .    I have instructed the patient to continue with pursuit of medical weight loss as a part of this program. Patient does meet criteria for use of anorectics at this time as BMI >30 , is not at treatment goal, and this medication is indicated for LONG TERM use for management of obesity.     The current plan for this month includes:   - Continue to work on lifestyle behavioral changes  - Continue to prioritize protein, fiber, and hydration.   - Discontinue topamax due to side effects (memory problem, fatigue, inefficacious)  - Start metformin.   -Treatment goal 175lb.       Orders:  -     metFORMIN ER (GLUCOPHAGE-XR) " 500 MG 24 hr tablet; Take 1 tablet by mouth 2 (Two) Times a Day With Meals. Take 1 daily with dinner or bedtime for one week, then increase to 2 daily.  Dispense: 60 tablet; Refill: 2  -     Semaglutide-Weight Management (Wegovy) 2.4 MG/0.75ML solution auto-injector; Inject 2.4 mg under the skin into the appropriate area as directed 1 (One) Time Per Week.  Dispense: 3 mL; Refill: 2    2. Polyphagia  Assessment & Plan:  Likely worse due to anti-depressants. Start metformin to counteract and improve insulin sensitivity.             Follow Up   Return in about 1 month (around 12/8/2023) for Next scheduled follow up.  Patient was given instructions and counseling regarding her condition or for health maintenance advice. Please see specific information pulled into the AVS if appropriate.     CYNTHIA Mcwilliams

## 2023-11-08 NOTE — ASSESSMENT & PLAN NOTE
Likely worse due to anti-depressants. Start metformin to counteract and improve insulin sensitivity.

## 2023-11-08 NOTE — ASSESSMENT & PLAN NOTE
Patient's (Body mass index is 32.04 kg/m².) indicates that they are obese (BMI >30) with health conditions that include dyslipidemias, GERD, and anxiety/depression  . Weight is improving with treatment. BMI  is above average; BMI management plan is completed. We discussed low calorie, low carb based diet program, portion control, increasing exercise, management of depression/anxiety/stress to control compensatory eating, and pharmacologic options including wegovy, metformin .    I have instructed the patient to continue with pursuit of medical weight loss as a part of this program. Patient does meet criteria for use of anorectics at this time as BMI >30 , is not at treatment goal, and this medication is indicated for LONG TERM use for management of obesity.     The current plan for this month includes:   - Continue to work on lifestyle behavioral changes  - Continue to prioritize protein, fiber, and hydration.   - Discontinue topamax due to side effects (memory problem, fatigue, inefficacious)  - Start metformin.   -Treatment goal 175lb.

## 2023-11-20 ENCOUNTER — TELEMEDICINE (OUTPATIENT)
Dept: FAMILY MEDICINE CLINIC | Facility: TELEHEALTH | Age: 53
End: 2023-11-20
Payer: COMMERCIAL

## 2023-11-20 DIAGNOSIS — J01.00 ACUTE MAXILLARY SINUSITIS, RECURRENCE NOT SPECIFIED: Primary | ICD-10-CM

## 2023-11-20 RX ORDER — AMOXICILLIN AND CLAVULANATE POTASSIUM 875; 125 MG/1; MG/1
1 TABLET, FILM COATED ORAL 2 TIMES DAILY
Qty: 20 TABLET | Refills: 0 | Status: SHIPPED | OUTPATIENT
Start: 2023-11-20 | End: 2023-11-30

## 2023-11-20 RX ORDER — FLUTICASONE PROPIONATE 50 MCG
2 SPRAY, SUSPENSION (ML) NASAL DAILY
COMMUNITY

## 2023-11-20 RX ORDER — LURASIDONE HYDROCHLORIDE 40 MG/1
40 TABLET, FILM COATED ORAL DAILY
COMMUNITY
Start: 2023-11-08

## 2023-11-20 NOTE — PROGRESS NOTES
CHIEF COMPLAINT  Chief Complaint   Patient presents with    Sinusitis         HPI  Erika Alfonso is a 53 y.o. female  presents with complaint of sinusitis. She has had this for 7-10 days and has suddenly gotten worse. She is having pain in her face and her teeth are hurting.   She did not take a COVID-19 test.       Review of Systems   Constitutional:  Positive for fatigue. Negative for chills, diaphoresis and fever.   HENT:  Positive for congestion, postnasal drip, sinus pressure, sinus pain and sore throat. Negative for rhinorrhea and sneezing.    Respiratory:  Positive for cough. Negative for chest tightness, shortness of breath and wheezing.    Gastrointestinal:  Negative for diarrhea, nausea and vomiting.   Musculoskeletal:  Negative for myalgias.   Neurological:  Positive for dizziness and headaches.       Past Medical History:   Diagnosis Date    Anxiety     Bell's palsy     Bipolar disorder     Constipation     Depression     Disease of thyroid gland     Esophageal reflux 08/30/2023    Hypercholesteremia 01/10/2019    PCOS (polycystic ovarian syndrome)        Family History   Problem Relation Age of Onset    Depression Mother     Hypertension Mother     Obesity Mother     COPD Mother     Heart disease Mother     Kidney disease Mother     Mental illness Mother     Miscarriages / Stillbirths Mother     Cancer Father     Depression Father     Hypertension Father     Arthritis Father     Hearing loss Father     Cancer Sister     Depression Sister     Obesity Sister     Hypertension Maternal Grandfather     Cancer Paternal Grandfather     Depression Paternal Grandfather     Hypertension Paternal Grandfather     Obesity Paternal Grandfather     Arthritis Paternal Grandfather     Diabetes Paternal Grandfather     Cancer Maternal Aunt     Depression Maternal Aunt     Hypertension Maternal Aunt     Obesity Maternal Aunt     Asthma Sister     Cancer Sister     Depression Sister     Miscarriages / Stillbirths Sister         Social History     Socioeconomic History    Marital status:    Tobacco Use    Smoking status: Never    Smokeless tobacco: Never   Substance and Sexual Activity    Alcohol use: Yes     Alcohol/week: 4.0 standard drinks of alcohol     Types: 4 Cans of beer per week     Comment: Occasionally    Drug use: No    Sexual activity: Yes     Partners: Male     Birth control/protection: Post-menopausal, None       Erika Alfonso  reports that she has never smoked. She has never used smokeless tobacco.  There were no vitals taken for this visit.    PHYSICAL EXAM  Physical Exam   Constitutional: She is oriented to person, place, and time. She appears well-developed and well-nourished. She has a sickly appearance. She does not appear ill. No distress.   HENT:   Head: Normocephalic and atraumatic.   Nose: Right sinus exhibits maxillary sinus tenderness. Right sinus exhibits no frontal sinus tenderness. Left sinus exhibits maxillary sinus tenderness. Left sinus exhibits no frontal sinus tenderness.   Eyes: EOM are normal.   Neck: Neck normal appearance.  Pulmonary/Chest: Effort normal.  No respiratory distress.  Neurological: She is alert and oriented to person, place, and time.   Skin: Skin is dry.   Psychiatric: She has a normal mood and affect.       Diagnoses and all orders for this visit:    1. Acute maxillary sinusitis, recurrence not specified (Primary)    Other orders  -     amoxicillin-clavulanate (AUGMENTIN) 875-125 MG per tablet; Take 1 tablet by mouth 2 (Two) Times a Day for 10 days.  Dispense: 20 tablet; Refill: 0        The use of a video visit has been reviewed with the patient and verbal informed consent has been obtained. Myself and Erika Alfonso participated in this visit. The patient is located in [address] [place] [address] [address] [facility]. I am located in [place], [place]. [employer] and [employer] were utilized.       Note Disclaimer: At [facility], we believe that sharing information builds trust and  better   relationships. You are receiving this note because you recently visited [facility]. It is possible you   will see health information before a provider has talked with you about it. This kind of information can   be easy to misunderstand. To help you fully understand what it means for your health, we urge you to   discuss this note with your provider.    Chayo Gibbs, CYNTHIA  11/20/2023  11:59 EST

## 2023-11-20 NOTE — PATIENT INSTRUCTIONS
Drink plenty of water  Over the counter pain relievers okay   If symptoms do not improve in 3-5 days follow up with your primary care provider or urgent care    Sinus Infection, Adult  A sinus infection is soreness and swelling (inflammation) of your sinuses. Sinuses are hollow spaces in the bones around your face. They are located:  Around your eyes.  In the middle of your forehead.  Behind your nose.  In your cheekbones.  Your sinuses and nasal passages are lined with a fluid called mucus. Mucus drains out of your sinuses. Swelling can trap mucus in your sinuses. This lets germs (bacteria, virus, or fungus) grow, which leads to infection. Most of the time, this condition is caused by a virus.  What are the causes?  Allergies.  Asthma.  Germs.  Things that block your nose or sinuses.  Growths in the nose (nasal polyps).  Chemicals or irritants in the air.  A fungus. This is rare.  What increases the risk?  Having a weak body defense system (immune system).  Doing a lot of swimming or diving.  Using nasal sprays too much.  Smoking.  What are the signs or symptoms?  The main symptoms of this condition are pain and a feeling of pressure around the sinuses. Other symptoms include:  Stuffy nose (congestion). This may make it hard to breathe through your nose.  Runny nose (drainage).  Soreness, swelling, and warmth in the sinuses.  A cough that may get worse at night.  Being unable to smell and taste.  Mucus that collects in the throat or the back of the nose (postnasal drip). This may cause a sore throat or bad breath.  Being very tired (fatigued).  A fever.  How is this diagnosed?  Your symptoms.  Your medical history.  A physical exam.  Tests to find out if your condition is short-term (acute) or long-term (chronic). Your doctor may:  Check your nose for growths (polyps).  Check your sinuses using a tool that has a light on one end (endoscope).  Check for allergies or germs.  Do imaging tests, such as an MRI or CT  scan.  How is this treated?  Treatment for this condition depends on the cause and whether it is short-term or long-term.  If caused by a virus, your symptoms should go away on their own within 10 days. You may be given medicines to relieve symptoms. They include:  Medicines that shrink swollen tissue in the nose.  A spray that treats swelling of the nostrils.  Rinses that help get rid of thick mucus in your nose (nasal saline washes).  Medicines that treat allergies (antihistamines).  Over-the-counter pain relievers.  If caused by bacteria, your doctor may wait to see if you will get better without treatment. You may be given antibiotic medicine if you have:  A very bad infection.  A weak body defense system.  If caused by growths in the nose, surgery may be needed.  Follow these instructions at home:  Medicines  Take, use, or apply over-the-counter and prescription medicines only as told by your doctor. These may include nasal sprays.  If you were prescribed an antibiotic medicine, take it as told by your doctor. Do not stop taking it even if you start to feel better.  Hydrate and humidify    Drink enough water to keep your pee (urine) pale yellow.  Use a cool mist humidifier to keep the humidity level in your home above 50%.  Breathe in steam for 10-15 minutes, 3-4 times a day, or as told by your doctor. You can do this in the bathroom while a hot shower is running.  Try not to spend time in cool or dry air.  Rest  Rest as much as you can.  Sleep with your head raised (elevated).  Make sure you get enough sleep each night.  General instructions    Put a warm, moist washcloth on your face 3-4 times a day, or as often as told by your doctor.  Use nasal saline washes as often as told by your doctor.  Wash your hands often with soap and water. If you cannot use soap and water, use hand .  Do not smoke. Avoid being around people who are smoking (secondhand smoke).  Keep all follow-up visits.  Contact a doctor  if:  You have a fever.  Your symptoms get worse.  Your symptoms do not get better within 10 days.  Get help right away if:  You have a very bad headache.  You cannot stop vomiting.  You have very bad pain or swelling around your face or eyes.  You have trouble seeing.  You feel confused.  Your neck is stiff.  You have trouble breathing.  These symptoms may be an emergency. Get help right away. Call 911.  Do not wait to see if the symptoms will go away.  Do not drive yourself to the hospital.  Summary  A sinus infection is swelling of your sinuses. Sinuses are hollow spaces in the bones around your face.  This condition is caused by tissues in your nose that become inflamed or swollen. This traps germs. These can lead to infection.  If you were prescribed an antibiotic medicine, take it as told by your doctor. Do not stop taking it even if you start to feel better.  Keep all follow-up visits.  This information is not intended to replace advice given to you by your health care provider. Make sure you discuss any questions you have with your health care provider.  Document Revised: 11/22/2022 Document Reviewed: 11/22/2022  Elsevier Patient Education © 2023 Elsevier Inc.

## 2023-12-05 ENCOUNTER — OFFICE VISIT (OUTPATIENT)
Dept: GASTROENTEROLOGY | Facility: CLINIC | Age: 53
End: 2023-12-05
Payer: COMMERCIAL

## 2023-12-05 VITALS
HEIGHT: 66 IN | WEIGHT: 203 LBS | BODY MASS INDEX: 32.62 KG/M2 | TEMPERATURE: 97.4 F | OXYGEN SATURATION: 98 % | HEART RATE: 76 BPM | SYSTOLIC BLOOD PRESSURE: 132 MMHG | DIASTOLIC BLOOD PRESSURE: 82 MMHG

## 2023-12-05 DIAGNOSIS — K62.89 PROCTITIS: ICD-10-CM

## 2023-12-05 DIAGNOSIS — R12 HEARTBURN: Primary | ICD-10-CM

## 2023-12-05 PROCEDURE — 99213 OFFICE O/P EST LOW 20 MIN: CPT | Performed by: INTERNAL MEDICINE

## 2023-12-05 NOTE — PROGRESS NOTES
Patient Name: Erika Alfonso  YOB: 1970   Medical Record number: [id number]     Chief Complaint: Heartburn      HPI Erika is an established patient of mine.  She was colonoscoped earlier this year.  She was found to have chronic inflammatory changes and biopsies consistent with proctitis.  She was asymptomatic at that time but we did start her on mesalamine suppositories..  She is here today to discuss follow-up of this but actually her major concern today was that of significant heartburn.  She has had this in the past and it was controlled with over-the-counter Nexium.  She was recently placed on a weight loss drug Wegovy with increase in her symptomatology.  She was instructed to avoid taking the Nexium and was placed on Pepcid.  It is not controlling her symptoms.  She has no dysphagia.  She is continuing to lose weight.  Because of the above problems she is here to discuss treatment options and therapy.    Past Medical History:   Past Medical History:   Diagnosis Date    Anxiety     Bell's palsy     Bipolar disorder     Constipation     Depression     Disease of thyroid gland     Esophageal reflux 08/30/2023    Hypercholesteremia 01/10/2019    PCOS (polycystic ovarian syndrome)        Family History:  Family History   Problem Relation Age of Onset    Depression Mother     Hypertension Mother     Obesity Mother     COPD Mother     Heart disease Mother     Kidney disease Mother     Mental illness Mother     Miscarriages / Stillbirths Mother     Cancer Father     Depression Father     Hypertension Father     Arthritis Father     Hearing loss Father     Cancer Sister     Depression Sister     Obesity Sister     Hypertension Maternal Grandfather     Cancer Paternal Grandfather     Depression Paternal Grandfather     Hypertension Paternal Grandfather     Obesity Paternal Grandfather     Arthritis Paternal Grandfather     Diabetes Paternal Grandfather     Cancer Maternal Aunt     Depression Maternal  Aunt     Hypertension Maternal Aunt     Obesity Maternal Aunt     Asthma Sister     Cancer Sister     Depression Sister     Miscarriages / Stillbirths Sister        Social History:   reports that she has never smoked. She has never used smokeless tobacco. She reports current alcohol use of about 3.0 standard drinks of alcohol per week. She reports that she does not use drugs.    Medications:     Current Outpatient Medications:     amphetamine-dextroamphetamine (ADDERALL) 20 MG tablet, Take 1 tablet by mouth 2 (Two) Times a Day., Disp: , Rfl:     buPROPion SR (WELLBUTRIN SR) 150 MG 12 hr tablet, Take 1 tablet by mouth 2 (Two) Times a Day., Disp: , Rfl:     busPIRone (BUSPAR) 7.5 MG tablet, Take 1 tablet by mouth every night at bedtime., Disp: , Rfl:     FLUoxetine (PROzac) 40 MG capsule, Take 1 capsule by mouth Every Morning., Disp: , Rfl:     fluticasone (FLONASE) 50 MCG/ACT nasal spray, 2 sprays into the nostril(s) as directed by provider Daily., Disp: , Rfl:     hydrOXYzine (ATARAX) 50 MG tablet, Take 1 tablet by mouth Every 8 (Eight) Hours As Needed., Disp: , Rfl:     lamoTRIgine (LaMICtal) 200 MG tablet, Take 1 tablet by mouth Daily., Disp: , Rfl:     Levocetirizine Dihydrochloride (XYZAL ALLERGY 24HR PO), Take 1 tablet by mouth Daily., Disp: , Rfl:     levothyroxine (SYNTHROID, LEVOTHROID) 100 MCG tablet, Take 1 tablet by mouth Daily., Disp: , Rfl:     linaclotide (Linzess) 72 MCG capsule capsule, TAKE 1 CAPSULE BY MOUTH EVERY DAY, Disp: 90 capsule, Rfl: 3    mesalamine (CANASA) 1000 MG suppository, UNWRAP AND INSERT 1 DAILY AT BEDTIME, Disp: , Rfl:     metFORMIN ER (GLUCOPHAGE-XR) 500 MG 24 hr tablet, Take 1 tablet by mouth 2 (Two) Times a Day With Meals. Take 1 daily with dinner or bedtime for one week, then increase to 2 daily., Disp: 60 tablet, Rfl: 2    multivitamin with minerals (Multivitamin Adults) tablet tablet, Take 1 tablet by mouth Daily., Disp: 30 tablet, Rfl: 2    Omega-3 Fatty Acids (fish oil)  "1000 MG capsule capsule, Take 1 capsule by mouth 2 (Two) Times a Day With Meals., Disp: , Rfl:     Semaglutide-Weight Management (Wegovy) 2.4 MG/0.75ML solution auto-injector, Inject 2.4 mg under the skin into the appropriate area as directed 1 (One) Time Per Week., Disp: 3 mL, Rfl: 2    traZODone (DESYREL) 50 MG tablet, Take 1 tablet by mouth Every Night., Disp: , Rfl:     Turmeric 500 MG capsule, Take 1 capsule by mouth Daily With Breakfast., Disp: , Rfl:     lurasidone (LATUDA) 40 MG tablet tablet, Take 1 tablet by mouth Daily. (Patient not taking: Reported on 12/5/2023), Disp: , Rfl:     Allergies:  Patient has no known allergies.    Review of Systems   Constitutional:  Negative for activity change, appetite change, fatigue, fever and unexpected weight change.   HENT:  Negative for hearing loss, trouble swallowing and voice change.    Eyes:  Negative for visual disturbance.   Respiratory:  Negative for cough, choking, chest tightness and shortness of breath.    Cardiovascular:  Negative for chest pain.   Gastrointestinal:  Positive for abdominal distention (bloating) and anal bleeding. Negative for abdominal pain, blood in stool, constipation, diarrhea, nausea, rectal pain and vomiting.        Heartburn   Endocrine: Negative for polydipsia and polyphagia.   Genitourinary: Negative.    Musculoskeletal:  Negative for gait problem and joint swelling.   Skin:  Negative for color change and rash.   Allergic/Immunologic: Negative for food allergies.   Neurological:  Negative for dizziness, seizures and speech difficulty.   Hematological:  Negative for adenopathy.   Psychiatric/Behavioral:  Negative for confusion.             Vitals:   /82 (BP Location: Left arm, Patient Position: Sitting, Cuff Size: Adult)   Pulse 76   Temp 97.4 °F (36.3 °C) (Temporal)   Ht 167.6 cm (65.98\")   Wt 92.1 kg (203 lb)   SpO2 98%   BMI 32.78 kg/m²      Physical Exam:   Constitutional: Pt is oriented to person, place, and time " and well-developed, well-nourished, and in no distress.     Psychiatric: Mood, memory, affect and judgment normal.           Assessment and Plan  Erika is having no symptoms with regards to the proctitis.  At this point in time we will continue her mesalamine suppositories.  With regards to her heartburn it is increased in intensity.  This is not surprising as she has a drug on board that is delaying her emptying.  I have actually instructed her to stop the Pepcid.  She can go back on the 20 mg of Nexium.  I have instructed her to take it in the morning with water wait 30 minutes and then eat something.  I have also stressed to her the importance of possibly even making her noon meal the biggest meal if she continues to have heartburn when she reinstituted therapy.  Eats it about 6:00 and usually does not retire till about 11/11/1930.  This additional time should be adequate for her to empty and not cause increasing heartburn.  If her symptoms do not improve we also discussed the possibility of an endoscopy but I think with reinstituting the PPI that her symptoms will resolve.  I think that if she remains asymptomatic from a proctitis standpoint over the next 3 to 4 months that her suppositories could be discontinued.  All of her questions were answered.  At least 20 minutes of time was spent before during and after the visit and the appropriate 36494 was billed        ICD-10-CM ICD-9-CM   1. Heartburn  R12 787.1   2. Proctitis  K62.89 569.49     Bird Davis M.D.  [facility]   Please note that portions of this note were completed with a voice recognition program.

## 2023-12-13 ENCOUNTER — TELEMEDICINE (OUTPATIENT)
Dept: BARIATRICS/WEIGHT MGMT | Facility: CLINIC | Age: 53
End: 2023-12-13
Payer: COMMERCIAL

## 2023-12-13 VITALS
WEIGHT: 202 LBS | DIASTOLIC BLOOD PRESSURE: 87 MMHG | TEMPERATURE: 97.7 F | OXYGEN SATURATION: 98 % | SYSTOLIC BLOOD PRESSURE: 137 MMHG | HEART RATE: 89 BPM | BODY MASS INDEX: 32.47 KG/M2 | HEIGHT: 66 IN

## 2023-12-13 DIAGNOSIS — E66.09 CLASS 1 OBESITY DUE TO EXCESS CALORIES WITHOUT SERIOUS COMORBIDITY WITH BODY MASS INDEX (BMI) OF 32.0 TO 32.9 IN ADULT: Primary | ICD-10-CM

## 2023-12-13 DIAGNOSIS — R63.2 POLYPHAGIA: ICD-10-CM

## 2023-12-13 PROCEDURE — 99214 OFFICE O/P EST MOD 30 MIN: CPT | Performed by: NURSE PRACTITIONER

## 2023-12-13 RX ORDER — ARIPIPRAZOLE 10 MG/1
10 TABLET ORAL DAILY
COMMUNITY

## 2023-12-13 RX ORDER — METFORMIN HYDROCHLORIDE 500 MG/1
TABLET, EXTENDED RELEASE ORAL
Qty: 90 TABLET | Refills: 2 | Status: SHIPPED | OUTPATIENT
Start: 2023-12-13

## 2023-12-13 NOTE — PROGRESS NOTES
Office Note      Date: 2023  Patient Name: Erika Alfonso  MRN: [id number]  : 1970    This service was conducted via [employer].  Patient is located at her home address.  Provider is located at her work address. The use of a video visit has been reviewed with the patient and informed consent has been obtained.  Subjective  Subjective     Chief Complaint  Obesity Management follow-up    Subjective          Erika Alfonso presents to [facility] via telehealth for obesity management.     Patient is unsatisfied with weight loss progress. Appetite is poorly controlled. Metformin has helped with cravings some. Did a trial of coming off of abilify and starting latuda but it did not go well. Has had URI since last visit which required antibiotics and oral steroids. Reports no side effects of prescribed medications today. She is not keeping a food journal at this time. Still using optavia snacks and 3 regular meals a day. Difficulty feeling full and getting hungry between meals. Her insurance is changing to [employer] in January.   The patient has not been as active due to upper respiratory infection. Is getting back into walking this week.     Review of Systems   Constitutional:  Negative for appetite change and fatigue.   Eyes:  Negative for blurred vision, double vision and visual disturbance.   Cardiovascular:  Negative for chest pain and palpitations.   Gastrointestinal:  Negative for abdominal pain, constipation, diarrhea, nausea, vomiting and GERD.   Endocrine: Positive for polyphagia. Negative for polydipsia and polyuria.   Musculoskeletal:  Negative for arthralgias, back pain and myalgias.   Neurological:  Negative for dizziness, tremors, light-headedness, headache and memory problem.        Parasthesias negative   Psychiatric/Behavioral:  Negative for sleep disturbance, depressed mood and stress. The patient is not nervous/anxious.      Objective   Start weight: 235.5  "pounds.    Total weight loss: -33 pounds/-14%  Change in weight since last visit: +4lb    Body mass index is 32.6 kg/m².   Measurements (in inches)     /87   Pulse 89   Temp 97.7 °F (36.5 °C)   Ht 167.6 cm (66\")   Wt 91.6 kg (202 lb)   SpO2 98%   BMI 32.60 kg/m²       Result Review :                 Assessment and Plan    Diagnoses and all orders for this visit:    1. Class 1 obesity due to excess calories without serious comorbidity with body mass index (BMI) of 32.0 to 32.9 in adult (Primary)  Assessment & Plan:  Patient's (Body mass index is 32.6 kg/m².) indicates that they are obese (BMI >30) with health conditions that include dyslipidemias, GERD, and BED, ADD  . Weight is worsening. BMI  is above average; BMI management plan is completed. We discussed low calorie, low carb based diet program, portion control, increasing exercise, joining a fitness center or start home based exercise program, management of depression/anxiety/stress to control compensatory eating, and pharmacologic options including wegovy, metformin, also taking bupropion and adderall with psych .    I have instructed the patient to continue with pursuit of medical weight loss as a part of this program. Patient does meet criteria for use of anorectics at this time as BMI >30  and is not at treatment goal.     The current plan for this month includes:   - Continue to work on lifestyle behavioral changes  - Increase metformin for polyphagia, was helpful at initiation last visit.   - Continue to be mindful of portions and snacking  - Plan ahead for two week trip to stay with her father  - Consider zepound if covered in the future. Insurance is changing in January, may have to find alternative to wegovy anyhow.  - Treatment goal 175lb       Orders:  -     metFORMIN ER (GLUCOPHAGE-XR) 500 MG 24 hr tablet; Take 1 tablet my mouth daily in the morning and two tablets by mouth daily with dinner.  Dispense: 90 tablet; Refill: 2    2. " Polyphagia  Assessment & Plan:  Worsening with abilify and oral steroids for URI. Metformin was helpful, increase dose today. Consider zepbound if available in the future. Also consider topiramate ER.             Follow Up   Return in about 5 weeks (around 1/17/2024) for Next scheduled follow up.  Patient was given instructions and counseling regarding her condition or for health maintenance advice. Please see specific information pulled into the AVS if appropriate.     Nannette Haas APRN

## 2023-12-13 NOTE — ASSESSMENT & PLAN NOTE
Patient's (Body mass index is 32.6 kg/m².) indicates that they are obese (BMI >30) with health conditions that include dyslipidemias, GERD, and BED, ADD  . Weight is worsening. BMI  is above average; BMI management plan is completed. We discussed low calorie, low carb based diet program, portion control, increasing exercise, joining a fitness center or start home based exercise program, management of depression/anxiety/stress to control compensatory eating, and pharmacologic options including wegovy, metformin, also taking bupropion and adderall with psych .    I have instructed the patient to continue with pursuit of medical weight loss as a part of this program. Patient does meet criteria for use of anorectics at this time as BMI >30  and is not at treatment goal.     The current plan for this month includes:   - Continue to work on lifestyle behavioral changes  - Increase metformin for polyphagia, was helpful at initiation last visit.   - Continue to be mindful of portions and snacking  - Plan ahead for two week trip to stay with her father  - Consider zepound if covered in the future. Insurance is changing in January, may have to find alternative to wegovy anyhow.  - Treatment goal 175lb

## 2023-12-13 NOTE — ASSESSMENT & PLAN NOTE
Worsening with abilify and oral steroids for URI. Metformin was helpful, increase dose today. Consider zepbound if available in the future. Also consider topiramate ER.

## 2024-01-31 DIAGNOSIS — M25.512 ARTHRALGIA OF LEFT SHOULDER REGION: Primary | ICD-10-CM

## 2024-02-01 ENCOUNTER — OFFICE VISIT (OUTPATIENT)
Dept: ORTHOPEDIC SURGERY | Facility: CLINIC | Age: 54
End: 2024-02-01
Payer: COMMERCIAL

## 2024-02-01 VITALS — TEMPERATURE: 97.6 F | WEIGHT: 212 LBS | BODY MASS INDEX: 34.07 KG/M2 | HEIGHT: 66 IN

## 2024-02-01 DIAGNOSIS — G89.11 ACUTE PAIN OF LEFT SHOULDER DUE TO TRAUMA: ICD-10-CM

## 2024-02-01 DIAGNOSIS — S46.012A TRAUMATIC TEAR OF LEFT ROTATOR CUFF, UNSPECIFIED TEAR EXTENT, INITIAL ENCOUNTER: Primary | ICD-10-CM

## 2024-02-01 DIAGNOSIS — M25.512 ACUTE PAIN OF LEFT SHOULDER DUE TO TRAUMA: ICD-10-CM

## 2024-02-01 NOTE — PROGRESS NOTES
Office Note     Name: Erika Alfonso    : 1970     MRN: [id number]     Chief Complaint  Pain of the Left Shoulder (States she had a fall on 24 and landed on her left side, her shoulder has been hurting since. Is it mostly painful with movement, she thinks she caught herself with her left arm but is not sure.)    Subjective     History of Present Illness:  Erika Alfonso is a 53 y.o. female presenting today for acute left shoulder pain ongoing for approximately a week after sustaining a fall onto her left shoulder on 2024.  Today she complains of anterior lateral shoulder pain that is exacerbated by flexion abduction and internal rotation.  She denies any numbness and tingling, she denies any previous injuries or surgeries to the affected area.  She states the pain will radiate from the anterior lateral shoulder down to the mid humerus.  She denies any neck pain or back pain or any other significant injuries at this time.  Patient works as a .    Review of Systems   Constitutional:  Negative for fever.   HENT:  Negative for dental problem and voice change.    Eyes:  Negative for visual disturbance.   Respiratory:  Negative for shortness of breath.    Cardiovascular:  Negative for chest pain.   Gastrointestinal:  Negative for abdominal pain.   Genitourinary:  Negative for dysuria.   Musculoskeletal:  Positive for arthralgias (left shoulder). Negative for gait problem and joint swelling.   Skin:  Positive for color change (left shoulder bruising). Negative for rash.   Neurological:  Negative for speech difficulty.   Hematological:  Does not bruise/bleed easily.   Psychiatric/Behavioral:  Negative for confusion.         Past Medical History:   Diagnosis Date    Anxiety     Bell's palsy     Bipolar disorder     Constipation     Depression     Disease of thyroid gland     Esophageal reflux 2023    Hypercholesteremia 01/10/2019    PCOS (polycystic ovarian syndrome)         Past  Surgical History:   Procedure Laterality Date    APPENDECTOMY N/A 10/03/2021    Procedure: APPENDECTOMY LAPAROSCOPIC;  Surgeon: Nick Perales MD;  Location: [facility];  Service: General;  Laterality: N/A;    BREAST BIOPSY  right    BREAST SURGERY      ENDOMETRIAL ABLATION      LAPAROSCOPIC TUBAL LIGATION      ORIF FOOT FRACTURE Right 2020    ORIF done by [facility], she is unsure of the surgeons name.    OVARIAN CYST REMOVAL         Family History   Problem Relation Age of Onset    Depression Mother     Hypertension Mother     Obesity Mother     COPD Mother     Heart disease Mother     Kidney disease Mother     Mental illness Mother     Miscarriages / Stillbirths Mother     Cancer Father     Depression Father     Hypertension Father     Arthritis Father     Hearing loss Father     Cancer Sister     Depression Sister     Obesity Sister     Hypertension Maternal Grandfather     Cancer Paternal Grandfather     Depression Paternal Grandfather     Hypertension Paternal Grandfather     Obesity Paternal Grandfather     Arthritis Paternal Grandfather     Diabetes Paternal Grandfather     Cancer Maternal Aunt     Depression Maternal Aunt     Hypertension Maternal Aunt     Obesity Maternal Aunt     Asthma Sister     Cancer Sister     Depression Sister     Miscarriages / Stillbirths Sister        Social History     Socioeconomic History    Marital status:    Tobacco Use    Smoking status: Never    Smokeless tobacco: Never   Vaping Use    Vaping Use: Never used   Substance and Sexual Activity    Alcohol use: Yes     Alcohol/week: 3.0 standard drinks of alcohol     Types: 3 Glasses of wine per week     Comment: Occasionally    Drug use: Never    Sexual activity: Defer     Partners: Male     Birth control/protection: Post-menopausal, None, Hysterectomy         Current Outpatient Medications:     amphetamine-dextroamphetamine (ADDERALL) 20 MG tablet, Take 1 tablet by mouth 2 (Two) Times a Day., Disp: , Rfl:     ARIPiprazole  "(ABILIFY) 10 MG tablet, Take 1 tablet by mouth Daily., Disp: , Rfl:     buPROPion SR (WELLBUTRIN SR) 150 MG 12 hr tablet, Take 1 tablet by mouth 2 (Two) Times a Day., Disp: , Rfl:     busPIRone (BUSPAR) 7.5 MG tablet, Take 1 tablet by mouth every night at bedtime., Disp: , Rfl:     FLUoxetine (PROzac) 40 MG capsule, Take 1 capsule by mouth Every Morning., Disp: , Rfl:     fluticasone (FLONASE) 50 MCG/ACT nasal spray, 2 sprays into the nostril(s) as directed by provider Daily., Disp: , Rfl:     hydrOXYzine (ATARAX) 50 MG tablet, Take 1 tablet by mouth Every 8 (Eight) Hours As Needed., Disp: , Rfl:     lamoTRIgine (LaMICtal) 200 MG tablet, Take 1 tablet by mouth Daily., Disp: , Rfl:     Levocetirizine Dihydrochloride (XYZAL ALLERGY 24HR PO), Take 1 tablet by mouth Daily., Disp: , Rfl:     levothyroxine (SYNTHROID, LEVOTHROID) 100 MCG tablet, Take 1 tablet by mouth Daily., Disp: , Rfl:     linaclotide (Linzess) 72 MCG capsule capsule, TAKE 1 CAPSULE BY MOUTH EVERY DAY, Disp: 90 capsule, Rfl: 3    metFORMIN ER (GLUCOPHAGE-XR) 500 MG 24 hr tablet, Take 1 tablet my mouth daily in the morning and two tablets by mouth daily with dinner., Disp: 90 tablet, Rfl: 2    multivitamin with minerals (Multivitamin Adults) tablet tablet, Take 1 tablet by mouth Daily., Disp: 30 tablet, Rfl: 2    Omega-3 Fatty Acids (fish oil) 1000 MG capsule capsule, Take 1 capsule by mouth 2 (Two) Times a Day With Meals., Disp: , Rfl:     Zepbound 10 MG/0.5ML solution auto-injector, , Disp: , Rfl:     No Known Allergies        Objective   Temp 97.6 °F (36.4 °C)   Ht 167.6 cm (65.98\")   Wt 96.2 kg (212 lb)   BMI 34.23 kg/m²            Physical Exam  Right Shoulder Exam     Muscle Strength   Abduction: 4/5   Internal rotation: 4/5   External rotation: 4/5   Supraspinatus: 4/5   Subscapularis: 4/5   Biceps: 4/5       Left Shoulder Exam     Tenderness   The patient is experiencing tenderness in the biceps tendon and acromion.    Range of Motion "   Active abduction:  90   Passive abduction:  120   Extension:  30   External rotation:  80   Forward flexion:  100   Internal rotation 0 degrees:  Sacrum   Internal rotation 90 degrees:  70     Muscle Strength   Abduction: 3/5   Internal rotation: 3/5   External rotation: 3/5   Supraspinatus: 3/5   Subscapularis: 3/5   Biceps: 3/5     Tests   Bui test: positive  Impingement: positive  Drop arm: negative    Other   Erythema: absent  Sensation: normal  Pulse: present            Extremity DVT signs are negative by clinical screen.     Independent Review of Radiographic Studies:    Three-view plain films of the left shoulder were done in office today for the evaluation of pain, no comparison views available.  There are no acute osseous abnormalities noted today.  The AP view does reveal decreased subacromial space with superior subluxation of the humeral head suspicious for massive rotator cuff tear.  This is not appreciated on the AP Grashey view.  No significant degenerative changes were noted of the glenohumeral joint or the AC joint.    Procedures    Assessment and Plan   Diagnoses and all orders for this visit:    1. Traumatic tear of left rotator cuff, unspecified tear extent, initial encounter (Primary)  -     MRI shoulder left wo contrast; Future  -     Ambulatory Referral to Physical Therapy Ortho    2. Acute pain of left shoulder due to trauma  -     MRI shoulder left wo contrast; Future  -     Ambulatory Referral to Physical Therapy Ortho       Discussion of orthopedic goals  Orthopedic activities reviewed and patient expressed appreciation  Regular exercise as tolerated  Risk, benefits, and merits of treatment alternatives reviewed with the patient and questions answered  Patient guided on mobility and conditioning exercises  Ice, heat, and/or modalities as beneficial  Physical therapy referral given  Reduced physical activity as appropriate and avoid offending activity  Weight bearing parameters  reviewed    Recommendations/Plan:  Exercise, medications, injections, other patient advice, and return appointment as noted.  Referral: Physical and Occupational Therapy referral.  Test/Studies: MRI left shoulder without contrast  Patient is encouraged to call or return for any issues or concerns.    Differential diagnosis:  Acute partial-thickness versus full-thickness rotator cuff tear, subacromial bursitis, rotator cuff tendinitis, labral tear    An MRI of the left shoulder was ordered to evaluate for the presence and extent of rotator cuff tear and to determine the need for surgery.  High suspicion for acute partial-thickness versus full-thickness rotator cuff tear.  She was also given a course of physical therapy for rehabilitation.  Advised continuing over-the-counter Tylenol and ibuprofen as well as muscle creams and ice and heat to the area as needed and beneficial.  Advised follow-up in 6 weeks and may follow-up sooner if MRI results are available.  Advised patient to call or return office for any concerns in the interim.    This is a new problem with uncertain prognosis.    Return in about 6 weeks (around 3/14/2024) for Recheck.  Patient agreeable to call or return sooner for any concerns.

## 2024-02-05 NOTE — PROGRESS NOTES
Office Note      Date: 2023  Patient Name: Erika Alfonso  MRN: [id number]  : 1970    Patient presents during the COVID-19 pandemic/federally declared state of public health emergency.  This service was conducted via [employer]. Patient is located at her work address.  Provider is located at her primary office location. The use of a video visit has been reviewed with the patient and verbal informed consent has been obtained.  Subjective  Subjective     Chief Complaint  Obesity Management follow-up    Subjective          Erika Alfonso presents to [facility] via telehealth for obesity management.     Patient is unsatisfied with weight loss progress. Lowest weight was 222lb on Monday. She usually just weighs herself once a week and she stepped on the scale for her visit today and feels discouraged that her weight is up 2lb from Monday. Appetite is well controlled. Feels confident she's hitting 1756-7705 calories a day. Still doing [employer] meal plan- started it . Sick of chicken and fish and brussel sprouts. Her  and daughter have both lost >10lb this month, she's also frustrated about that. Reports constipation with wegovy is better with stool softener, just finished 4th pen of the 0.5mg.The patient is exercising, walking outside 30-45 minutes 5-6 times a week. Also stretching and using resistance bands, using medium band now.     Review of Systems   Constitutional: Negative for appetite change and fatigue.   Eyes: Negative for blurred vision, double vision and visual disturbance.   Cardiovascular: Negative for chest pain and palpitations.   Gastrointestinal: Positive for constipation. Negative for abdominal pain, diarrhea, nausea, vomiting and GERD.   Endocrine: Negative for polydipsia, polyphagia and polyuria.   Musculoskeletal: Positive for myalgias. Negative for arthralgias and back pain.   Neurological: Negative for dizziness, tremors,  Called Colonoscopy: [phone]   "light-headedness, headache and memory problem.        Parasthesias negative   Psychiatric/Behavioral: Negative for sleep disturbance, depressed mood and stress. The patient is not nervous/anxious.      Objective   Body mass index is 36.15 kg/m².  Start weight: 235.5 pounds.    Total weight loss: -11.5 pounds/4%  Change in weight since last visit: -4lb    Measurements (in inches)  Waist: 42\"  /77   Temp 97.8 °F (36.6 °C)   Ht 167.6 cm (66\")   Wt 102 kg (224 lb)   SpO2 98%   BMI 36.15 kg/m²       Result Review :                 Assessment and Plan    Diagnoses and all orders for this visit:    1. Class 2 obesity due to excess calories without serious comorbidity with body mass index (BMI) of 36.0 to 36.9 in adult (Primary)  Assessment & Plan:  Patient's (Body mass index is 36.15 kg/m².) indicates that they are morbidly/severely obese (BMI > 40 or > 35 with obesity - related health condition) with health conditions that include dyslipidemias . Weight is improving with treatment. BMI  is above average; BMI management plan is completed. We discussed low calorie, low carb based diet program, portion control, increasing exercise, pharmacologic options including wegovy and an angelica-based approach such as [employer] Pal or Lose It.    I have instructed the patient to continue with pursuit of medical weight loss as a part of this program. Patient does meet criteria for use of anorectics at this time as BMI >30  and is not at treatment goal.     Continue nutritional focus and work towards new exercise FITT goal of: 2-2-4-2.  The current plan for this month includes: continue current exercise efforts, weight loss goal 6-8lbs this month and continue nutrition focus. Increase wegovy to 1mg. Will contact me in 3 weeks to let me know if she wants to continue this dose (only if bothersome side effects) or increase as planned.  Treatment goal 175lb.        Orders:  -     Semaglutide-Weight Management (Wegovy) 1 MG/0.5ML " solution auto-injector; Inject 1 mg under the skin into the appropriate area as directed 1 (One) Time Per Week.  Dispense: 2 mL; Refill: 0    2. Polyphagia  Assessment & Plan:  Controlled with medication and diet changes.       3. Hypercholesteremia  Assessment & Plan:  Continue to make healthy lifestyle choices with calorie deficit and regular aerobic exercise with limited saturated fat.           Follow Up   Return in about 6 weeks (around 5/11/2023) for Next scheduled follow up.  Patient was given instructions and counseling regarding her condition or for health maintenance advice. Please see specific information pulled into the AVS if appropriate.     CYNTHIA Mcwilliams

## 2024-02-06 ENCOUNTER — TREATMENT (OUTPATIENT)
Dept: PHYSICAL THERAPY | Facility: CLINIC | Age: 54
End: 2024-02-06
Payer: COMMERCIAL

## 2024-02-06 DIAGNOSIS — M25.512 ACUTE PAIN OF LEFT SHOULDER: Primary | ICD-10-CM

## 2024-02-06 PROCEDURE — 97161 PT EVAL LOW COMPLEX 20 MIN: CPT | Performed by: PHYSICAL THERAPIST

## 2024-02-06 PROCEDURE — 97530 THERAPEUTIC ACTIVITIES: CPT | Performed by: PHYSICAL THERAPIST

## 2024-02-06 NOTE — PROGRESS NOTES
Physical Therapy Initial Evaluation and Plan of Care   [address] [place] [address]      Patient: Erika Alfonso   : 1970  Diagnosis/ICD-10 Code:  Acute pain of left shoulder [M25.512]  Referring practitioner: Israel Alexis PA-C    Subjective Evaluation    History of Present Illness  Date of onset: 2024  Mechanism of injury: Pt reports that she slipped and fell on . Pt reports that she hurt her L shoulder and L ankle. Pt reports that she has had an xray of the L shoulder and has an MRI on her shoulder on the  of this month. Pt reports that she has not pain at rest. Pt reports that driving, vacuuming, washing her hair makes pain worse. Pt reports that she is able to lift arm overhead although not without pain. Pt reports that pain wakes her up at night when she rolls over.       Patient Occupation:  Pain  Current pain ratin  At worst pain ratin  Quality: sharp  Relieving factors: medications  Aggravating factors: overhead activity, outstretched reach, movement and sleeping  Progression: no change    Social Support  Lives with: spouse    Hand dominance: right    Diagnostic Tests  X-ray: normal    Treatments  Previous treatment: medication  Current treatment: medication  Patient Goals  Patient goals for therapy: decreased pain, increased motion and increased strength             Objective          Tenderness     Left Shoulder   Tenderness in the supraspinatus tendon. No tenderness in the biceps tendon (proximal).     Right Shoulder  No tenderness in the biceps tendon (proximal) and supraspinatus tendon.     Neurological Testing     Reflexes   Left   Biceps (C5/C6): normal (2+)  Brachioradialis (C6): normal (2+)  Triceps (C7): normal (2+)    Right   Biceps (C5/C6): normal (2+)  Brachioradialis (C6): normal (2+)  Triceps (C7): normal (2+)    Active Range of Motion   Left Shoulder   Flexion: 76 degrees   Abduction: 52 degrees     Right Shoulder    Flexion: 143 degrees   Abduction: 158 degrees     Passive Range of Motion   Left Shoulder   Flexion: 107 degrees   Abduction: 95 degrees   External rotation 90°: 65 degrees   Internal rotation 90°: 65 degrees     Right Shoulder   Flexion: 156 degrees   Abduction: 155 degrees   External rotation 90°: 86 degrees   Internal rotation 90°: 66 degrees     Strength/Myotome Testing     Left Shoulder     Planes of Motion   External rotation at 0°: 4   Internal rotation at 0°: 4     Right Shoulder     Planes of Motion   External rotation at 0°: 5   Internal rotation at 0°: 5     Tests     Left Shoulder   Positive Hawkin's.   Negative drop arm.           Assessment & Plan       Assessment  Impairments: abnormal muscle firing, abnormal or restricted ROM, activity intolerance, impaired physical strength, lacks appropriate home exercise program and pain with function   Functional limitations: carrying objects, lifting, sleeping, pulling, pushing, uncomfortable because of pain, reaching behind back and reaching overhead   Assessment details: Patient is a 53 year old female who comes to physical therapy following fall onto her L shoulder. Signs and symptoms are consistent with tendonitis and inflammation of the RC after trauma resulting in pain, decreased ROM, decreased strength, and inability to perform all essential functional activities. Pt will benefit from skilled PT services to address the above issues.       Prognosis: good    Goals  Plan Goals: SHORT TERM GOALS:     2 weeks  1. Pt I w/ HEP  2. Pt to demonstrate PROM of the left shoulder to WFL to improve ability to perform ADL's  3. Pt to demonstrate ability to perform 30 minutes continuous activity in the clinic without increase in pain     LONG TERM GOALS:   6 weeks  1. Pt to demonstrate AROM of the left shoulder to WNL to allow ability to perform all necessary functional activities  2. Pt to demonstrate ability to lift 5# OH with the left arm without increase in  pain  3. Pt to report being able to work full duty without increase in pain in the shoulder  4. Pt to tolerate 60 minutes continuous activity in the clinic without increase in pain             Plan  Therapy options: will be seen for skilled therapy services  Planned modality interventions: cryotherapy  Planned therapy interventions: balance/weight-bearing training, flexibility, functional ROM exercises, home exercise program, joint mobilization, manual therapy, motor coordination training, neuromuscular re-education, soft tissue mobilization, strengthening, stretching and therapeutic activities  Frequency: 2x week  Duration in weeks: 6  Treatment plan discussed with: patient        Timed Treatment:  Manual Therapy:         mins  77277;  Therapeutic Exercise:         mins  94078;     Neuromuscular Fidel:        mins  33342;    Therapeutic Activity:    10      mins  32055;     Gait Training:           mins  77712;     Ultrasound:          mins  74608;    Electrical Stimulation:         mins  45162 ( );  Dry Needling          mins self-pay  Iontophoresis          mins [id number]    Untimed Treatments:  Electrical Stimulation:         mins  31038 ( );  Dry Needling:                     mins  Ultrasound:                         mins  84391;      Timed Treatment:   10   mins   Total Treatment:     49   mins    PT SIGNATURE: Reid Herbert PT   [place] License: [id number]  DATE TREATMENT INITIATED: 2/6/2024    Initial Certification  Certification Period: 5/5/2024  I certify that the therapy services are furnished while this patient is under my care.  The services outlined above are required by this patient, and will be reviewed every 90 days.     PHYSICIAN: Israel Alexis PA-C      DATE:     Please sign and return via fax to [phone].. Thank you, [facility].

## 2024-02-12 ENCOUNTER — TELEPHONE (OUTPATIENT)
Dept: PHYSICAL THERAPY | Facility: OTHER | Age: 54
End: 2024-02-12
Payer: COMMERCIAL

## 2024-02-20 ENCOUNTER — TELEMEDICINE (OUTPATIENT)
Dept: BARIATRICS/WEIGHT MGMT | Facility: CLINIC | Age: 54
End: 2024-02-20
Payer: COMMERCIAL

## 2024-02-20 VITALS
HEART RATE: 79 BPM | SYSTOLIC BLOOD PRESSURE: 126 MMHG | OXYGEN SATURATION: 98 % | WEIGHT: 209 LBS | HEIGHT: 66 IN | DIASTOLIC BLOOD PRESSURE: 79 MMHG | BODY MASS INDEX: 33.59 KG/M2

## 2024-02-20 DIAGNOSIS — F98.8 ATTENTION DEFICIT DISORDER (ADD) WITHOUT HYPERACTIVITY: ICD-10-CM

## 2024-02-20 DIAGNOSIS — E66.09 CLASS 1 OBESITY DUE TO EXCESS CALORIES WITHOUT SERIOUS COMORBIDITY WITH BODY MASS INDEX (BMI) OF 32.0 TO 32.9 IN ADULT: Primary | ICD-10-CM

## 2024-02-20 RX ORDER — TIRZEPATIDE 12.5 MG/.5ML
12.5 INJECTION, SOLUTION SUBCUTANEOUS WEEKLY
COMMUNITY
Start: 2024-02-14

## 2024-02-20 NOTE — PROGRESS NOTES
"     Office Note      Date: 2024  Patient Name: Erika Alfonso  MRN: [id number]  : 1970    This service was conducted via [employer].  Patient is located at her home address.  Provider is located at her work address. The use of a video visit has been reviewed with the patient and informed consent has been obtained.  Subjective  Subjective     Chief Complaint  Obesity Management follow-up    Subjective          Erika Alfonso presents to [facility] via telehealth for obesity management.     Patient is satisfied with weight loss progress. Appetite is moderately controlled, was craving sugar. Reports no side effects of prescribed medications today. Hasn't felt any effects of zepbound at all yet, had first injection Thursday. Going off of adderall and starting strattera. She is off of hydroxyzine and trazadone due to problems with her memory. Is taking melatonin occasionally. She is keeping a food journal in her planner. [place] and Thanksgiving were hard for her. Fell and hurt her shoulder at the end of December, not doing any upper body activity. Still walking 4-5 times a week for 30-45 minutes. Also started a hypnosis for weight loss angelica. Meditation.   24 hour recall:  B: (11am): yogurt and protein granola  D: spinach salad with salmon  No snacking after dinner   B: yogurt and protein granola    Review of Systems   Constitutional:  Positive for appetite change and fatigue.   Gastrointestinal:  Negative for constipation and indigestion.   Endocrine: Positive for polyphagia.   Neurological:  Negative for tremors and light-headedness.     Objective   Start weight: 235.5 pounds.    Total weight loss: -26.5 pounds/-11.2%  Change in weight since last visit: -7lb    Body mass index is 33.75 kg/m².   Measurements (in inches)     45.5\" waist  /79   Pulse 79   Ht 167.6 cm (65.98\")   Wt 94.8 kg (209 lb)   SpO2 98%   BMI 33.75 kg/m²       Result Review :               "   Assessment and Plan    Diagnoses and all orders for this visit:    1. Class 1 obesity due to excess calories without serious comorbidity with body mass index (BMI) of 32.0 to 32.9 in adult (Primary)  Assessment & Plan:  Patient's (Body mass index is 33.75 kg/m².) indicates that they are obese (BMI >30) with health conditions that include dyslipidemias and GERD . Weight is worsening. BMI  is above average; BMI management plan is completed. We discussed low calorie, low carb based diet program, portion control, increasing exercise, management of depression/anxiety/stress to control compensatory eating, pharmacologic options including zepbound, wellbutrin (MHNP), metformin, and an angelica-based approach such as [employer] Pal or Lose It.     I have instructed the patient to continue with pursuit of medical weight loss as a part of this program. Patient does meet criteria for use of anorectics at this time as BMI >30  and is not at treatment goal.     The current plan for this month includes:   - Continue current exercise efforts  - Continue to prioritize protein, fiber, and hydration.   - Continue zepbound, was prescribed by TRACY, will request refill from me when needed. Discussed option to increase to 15mg for increased efficacy.   - Interested in starting phentermine since she's no longer taking adderall, can prescribe next visit after UDS. Adderall was last picked up 01/29 with 30 day supply.   - Treatment goal 175lb.         2. Attention deficit disorder (ADD) without hyperactivity  Assessment & Plan:  Psychological condition is improving with lifestyle modifications.  Regular aerobic exercise.  Medications managed by TRACY, plans to stop adderall and start Strattera. Intersted in starting phentermine for weight loss.   Psychological condition  will be reassessed at the next regular appointment.            Follow Up   Return in about 5 weeks (around 3/26/2024) for Next scheduled follow up, in office, Labs next  visit.  Patient was given instructions and counseling regarding her condition or for health maintenance advice. Please see specific information pulled into the AVS if appropriate.     CYNTHIA Mcwilliams

## 2024-02-20 NOTE — ASSESSMENT & PLAN NOTE
Patient's (Body mass index is 33.75 kg/m².) indicates that they are obese (BMI >30) with health conditions that include dyslipidemias and GERD . Weight is worsening. BMI  is above average; BMI management plan is completed. We discussed low calorie, low carb based diet program, portion control, increasing exercise, management of depression/anxiety/stress to control compensatory eating, pharmacologic options including zepbound, wellbutrin ([facility]), metformin, and an angelica-based approach such as Convertigo Pal or Lose It.     I have instructed the patient to continue with pursuit of medical weight loss as a part of this program. Patient does meet criteria for use of anorectics at this time as BMI >30  and is not at treatment goal.     The current plan for this month includes:   - Continue current exercise efforts  - Continue to prioritize protein, fiber, and hydration.   - Continue zepbound, was prescribed by TRACY, will request refill from me when needed. Discussed option to increase to 15mg for increased efficacy.   - Interested in starting phentermine since she's no longer taking adderall, can prescribe next visit after UDS. Adderall was last picked up 01/29 with 30 day supply.   - Treatment goal 175lb.

## 2024-02-20 NOTE — ASSESSMENT & PLAN NOTE
Psychological condition is improving with lifestyle modifications.  Regular aerobic exercise.  Medications managed by [facility], plans to stop adderall and start Strattera. Intersted in starting phentermine for weight loss.   Psychological condition  will be reassessed at the next regular appointment.

## 2024-02-23 ENCOUNTER — HOSPITAL ENCOUNTER (OUTPATIENT)
Dept: MRI IMAGING | Facility: HOSPITAL | Age: 54
Discharge: HOME | End: 2024-02-23
Admitting: STUDENT IN AN ORGANIZED HEALTH CARE EDUCATION/TRAINING PROGRAM
Payer: COMMERCIAL

## 2024-02-23 DIAGNOSIS — S46.012A TRAUMATIC TEAR OF LEFT ROTATOR CUFF, UNSPECIFIED TEAR EXTENT, INITIAL ENCOUNTER: ICD-10-CM

## 2024-02-23 DIAGNOSIS — M25.512 ACUTE PAIN OF LEFT SHOULDER DUE TO TRAUMA: ICD-10-CM

## 2024-02-23 DIAGNOSIS — G89.11 ACUTE PAIN OF LEFT SHOULDER DUE TO TRAUMA: ICD-10-CM

## 2024-02-23 PROCEDURE — 73221 MRI JOINT UPR EXTREM W/O DYE: CPT

## 2024-02-27 ENCOUNTER — TELEPHONE (OUTPATIENT)
Dept: ORTHOPEDIC SURGERY | Facility: CLINIC | Age: 54
End: 2024-02-27
Payer: COMMERCIAL

## 2024-02-27 ENCOUNTER — OFFICE VISIT (OUTPATIENT)
Dept: ORTHOPEDIC SURGERY | Facility: CLINIC | Age: 54
End: 2024-02-27
Payer: COMMERCIAL

## 2024-02-27 VITALS — BODY MASS INDEX: 34.55 KG/M2 | TEMPERATURE: 97.5 F | WEIGHT: 215 LBS | HEIGHT: 66 IN

## 2024-02-27 DIAGNOSIS — S46.012D TRAUMATIC INCOMPLETE TEAR OF LEFT ROTATOR CUFF, SUBSEQUENT ENCOUNTER: Primary | ICD-10-CM

## 2024-02-27 PROCEDURE — 99213 OFFICE O/P EST LOW 20 MIN: CPT | Performed by: STUDENT IN AN ORGANIZED HEALTH CARE EDUCATION/TRAINING PROGRAM

## 2024-02-27 NOTE — PROGRESS NOTES
Office Note     Name: Erika Alfonso    : 1970     MRN: [id number]     Chief Complaint  Follow-up of the Left Shoulder (MRI results)    Subjective     History of Present Illness:  Erika Alfonso is a 53 y.o. female presenting today for follow-up to discuss MRI results.  MRI results are shown below.  Patient states that she has not been in physical therapy for the past couple of weeks because her first visit significantly increased her pain.  She is going back to physical therapy today and plans on doing physical therapy twice a week for the next 6 weeks.    Review of Systems   Musculoskeletal:  Positive for arthralgias (left shoulder).        Past Medical History:   Diagnosis Date    Anxiety     Bell's palsy     Bipolar disorder     Constipation     Depression     Disease of thyroid gland     Esophageal reflux 2023    Hypercholesteremia 01/10/2019    PCOS (polycystic ovarian syndrome)         Past Surgical History:   Procedure Laterality Date    APPENDECTOMY N/A 10/03/2021    Procedure: APPENDECTOMY LAPAROSCOPIC;  Surgeon: Nick Perales MD;  Location: [facility];  Service: General;  Laterality: N/A;    BREAST BIOPSY  right    BREAST SURGERY      ENDOMETRIAL ABLATION      LAPAROSCOPIC TUBAL LIGATION      ORIF FOOT FRACTURE Right     ORIF done by [facility], she is unsure of the surgeons name.    OVARIAN CYST REMOVAL         Family History   Problem Relation Age of Onset    Depression Mother     Hypertension Mother     Obesity Mother     COPD Mother     Heart disease Mother     Kidney disease Mother     Mental illness Mother     Miscarriages / Stillbirths Mother     Cancer Father     Depression Father     Hypertension Father     Arthritis Father     Hearing loss Father     Cancer Sister     Depression Sister     Obesity Sister     Hypertension Maternal Grandfather     Cancer Paternal Grandfather     Depression Paternal Grandfather     Hypertension Paternal Grandfather     Obesity  Paternal Grandfather     Arthritis Paternal Grandfather     Diabetes Paternal Grandfather     Cancer Maternal Aunt     Depression Maternal Aunt     Hypertension Maternal Aunt     Obesity Maternal Aunt     Asthma Sister     Cancer Sister     Depression Sister     Miscarriages / Stillbirths Sister        Social History     Socioeconomic History    Marital status:    Tobacco Use    Smoking status: Never    Smokeless tobacco: Never   Vaping Use    Vaping Use: Never used   Substance and Sexual Activity    Alcohol use: Yes     Alcohol/week: 3.0 standard drinks of alcohol     Types: 3 Glasses of wine per week     Comment: Occasionally    Drug use: Never    Sexual activity: Defer     Partners: Male     Birth control/protection: Post-menopausal, None, Hysterectomy         Current Outpatient Medications:     amphetamine-dextroamphetamine (ADDERALL) 20 MG tablet, Take 1 tablet by mouth 2 (Two) Times a Day., Disp: , Rfl:     ARIPiprazole (ABILIFY) 10 MG tablet, Take 1 tablet by mouth Daily., Disp: , Rfl:     buPROPion SR (WELLBUTRIN SR) 150 MG 12 hr tablet, Take 1 tablet by mouth 2 (Two) Times a Day., Disp: , Rfl:     busPIRone (BUSPAR) 7.5 MG tablet, Take 1 tablet by mouth every night at bedtime., Disp: , Rfl:     FLUoxetine (PROzac) 40 MG capsule, Take 1 capsule by mouth Every Morning., Disp: , Rfl:     fluticasone (FLONASE) 50 MCG/ACT nasal spray, 2 sprays into the nostril(s) as directed by provider Daily., Disp: , Rfl:     lamoTRIgine (LaMICtal) 200 MG tablet, Take 1 tablet by mouth Daily., Disp: , Rfl:     Levocetirizine Dihydrochloride (XYZAL ALLERGY 24HR PO), Take 1 tablet by mouth Daily., Disp: , Rfl:     levothyroxine (SYNTHROID, LEVOTHROID) 100 MCG tablet, Take 1 tablet by mouth Daily., Disp: , Rfl:     linaclotide (Linzess) 72 MCG capsule capsule, TAKE 1 CAPSULE BY MOUTH EVERY DAY, Disp: 90 capsule, Rfl: 3    melatonin 5 MG sublingual tablet sublingual tablet, Place  under the tongue At Night As Needed., Disp:  ", Rfl:     metFORMIN ER (GLUCOPHAGE-XR) 500 MG 24 hr tablet, Take 1 tablet my mouth daily in the morning and two tablets by mouth daily with dinner., Disp: 90 tablet, Rfl: 2    multivitamin with minerals (Multivitamin Adults) tablet tablet, Take 1 tablet by mouth Daily., Disp: 30 tablet, Rfl: 2    Omega-3 Fatty Acids (fish oil) 1000 MG capsule capsule, Take 1 capsule by mouth 2 (Two) Times a Day With Meals., Disp: , Rfl:     Zepbound 12.5 MG/0.5ML solution auto-injector, Inject 0.5 mL under the skin into the appropriate area as directed 1 (One) Time Per Week., Disp: , Rfl:     No Known Allergies        Objective   Temp 97.5 °F (36.4 °C)   Ht 167.6 cm (66\")   Wt 97.5 kg (215 lb)   BMI 34.70 kg/m²            Physical Exam  Ortho Exam   Extremity DVT signs are negative by clinical screen.     Independent Review of Radiographic Studies:      Study Result    Narrative & Impression   MRI SHOULDER LEFT WO CONTRAST     Date of Exam: 2/23/2024 7:16 PM EST     Indication: left shoulder pain following fall.  Suspect acute partial vs. full thickness rotator cuff tear..     Comparison: Shoulder radiographs 2/1/2024     Technique:  Routine multiplanar/multisequence images of the left shoulder were obtained without contrast administration.          Findings:  Acromioclavicular joint: Mild degenerative changes of the acromioclavicular joint. Mild  subacromial/subdeltoid bursal fluid/edema. Type I acromion. Os acromiale is absent.     Rotator cuff: Tendinopathy with small focal moderate grade bursal sided partial tear of the distal anterior fibers of the supraspinatus tendon. No tear or substantial tendinopathy of the infraspinatus tendon. No tear or substantial tendinopathy of the   subscapularis tendon. Myotendinous strain of the teres minor tendon. No fatty atrophy or edematous changes of the the rotator cuff muscles.     Labrum: Degenerative tearing of the superior labrum.     Biceps tendon: Normal position and signal of the " long head of the biceps tendon.     Joint: No substantial joint effusion. Articular cartilage is intact. No subchondral edema. No evidence of capsulitis.     Bones:  No fracture or marrow edema. No suspicious marrow replacing lesions.     Soft tissues: No soft tissue masses or fluid collections seen.     IMPRESSION:  Impression:  Rotator cuff tendinopathy with small focal moderate grade bursal sided partial tear of the anterior distal fibers of supraspinatus.     Edema at the myotendinous junction of teres minor may present low-grade strain.           Electronically Signed: Peter Keenan MD    2/26/2024 5:12 PM EST    Workstation ID: [id number]       Procedures    Assessment and Plan   Diagnoses and all orders for this visit:    1. Traumatic incomplete tear of left rotator cuff, subsequent encounter (Primary)       Discussion of orthopedic goals  Orthopedic activities reviewed and patient expressed appreciation  Regular exercise as tolerated  Risk, benefits, and merits of treatment alternatives reviewed with the patient and questions answered  Patient guided on mobility and conditioning exercises  Ice, heat, and/or modalities as beneficial  Physical therapy ongoing  Reduced physical activity as appropriate and avoid offending activity  Weight bearing parameters reviewed  Counseling on diet, nutrition, fitness exercise, and weight reduction goals    Recommendations/Plan:  Exercise, medications, injections, other patient advice, and return appointment as noted.  Patient is encouraged to call or return for any issues or concerns.    Discussed MRI results with the patient.  At this time I advised to continue with conservative therapy including physical therapy and injection.  Do not recommend surgery at this time.  Advised to continue with ice and heat Voltaren gel and over-the-counter analgesics as needed.  Advised to follow-up on Friday to receive subacromial injection.  Injection not done today as patient has  physical therapy later today.  After that advised follow-up after the first 6 weeks of PT.  Patient agreeable with plan.    Return in about 3 days (around 3/1/2024) for shoulder injection .  Patient agreeable to call or return sooner for any concerns.

## 2024-02-29 ENCOUNTER — OFFICE VISIT (OUTPATIENT)
Dept: ORTHOPEDIC SURGERY | Facility: CLINIC | Age: 54
End: 2024-02-29
Payer: COMMERCIAL

## 2024-02-29 VITALS — TEMPERATURE: 96.7 F | HEIGHT: 66 IN | WEIGHT: 215 LBS | BODY MASS INDEX: 34.55 KG/M2

## 2024-02-29 DIAGNOSIS — G89.11 ACUTE PAIN OF LEFT SHOULDER DUE TO TRAUMA: ICD-10-CM

## 2024-02-29 DIAGNOSIS — S46.012D TRAUMATIC INCOMPLETE TEAR OF LEFT ROTATOR CUFF, SUBSEQUENT ENCOUNTER: Primary | ICD-10-CM

## 2024-02-29 DIAGNOSIS — M25.512 ACUTE PAIN OF LEFT SHOULDER DUE TO TRAUMA: ICD-10-CM

## 2024-02-29 RX ORDER — METHYLPREDNISOLONE ACETATE 40 MG/ML
40 INJECTION, SUSPENSION INTRA-ARTICULAR; INTRALESIONAL; INTRAMUSCULAR; SOFT TISSUE
Status: COMPLETED | OUTPATIENT
Start: 2024-02-29 | End: 2024-02-29

## 2024-02-29 RX ORDER — LIDOCAINE HYDROCHLORIDE 20 MG/ML
2 INJECTION, SOLUTION INFILTRATION; PERINEURAL
Status: COMPLETED | OUTPATIENT
Start: 2024-02-29 | End: 2024-02-29

## 2024-02-29 RX ADMIN — LIDOCAINE HYDROCHLORIDE 2 ML: 20 INJECTION, SOLUTION INFILTRATION; PERINEURAL at 13:34

## 2024-02-29 RX ADMIN — METHYLPREDNISOLONE ACETATE 40 MG: 40 INJECTION, SUSPENSION INTRA-ARTICULAR; INTRALESIONAL; INTRAMUSCULAR; SOFT TISSUE at 13:34

## 2024-02-29 NOTE — PROGRESS NOTES
"    Office Note     Name: Erika Alfonso    : 1970     MRN: [id number]     Chief Complaint  Injections of the Left Shoulder (Here for cortisone injection.)    Subjective     History of Present Illness:  Erika Alfonso is a 53 y.o. female here today for injection therapy.    Review of Systems     Past Medical History:   Diagnosis Date    Anxiety     Bell's palsy     Bipolar disorder     Constipation     Depression     Disease of thyroid gland     Esophageal reflux 2023    Hypercholesteremia 01/10/2019    PCOS (polycystic ovarian syndrome)          Past Surgical History:   Procedure Laterality Date    APPENDECTOMY N/A 10/03/2021    Procedure: APPENDECTOMY LAPAROSCOPIC;  Surgeon: Nick Perales MD;  Location: [facility];  Service: General;  Laterality: N/A;    BREAST BIOPSY  right    BREAST SURGERY      ENDOMETRIAL ABLATION      LAPAROSCOPIC TUBAL LIGATION      ORIF FOOT FRACTURE Right 2020    ORIF done by [facility], she is unsure of the surgeons name.    OVARIAN CYST REMOVAL         No Known Allergies      Objective   Temp 96.7 °F (35.9 °C)   Ht 167.6 cm (65.98\")   Wt 97.5 kg (215 lb)   BMI 34.72 kg/m²    Physical Exam    Skin exam stable with no erythema, ecchymosis or rash.  No new swelling.  No motor or sensory changes.  Distal pulse intact.    Large Joint Arthrocentesis: L subacromial bursa  Date/Time: 2024 1:34 PM  Consent given by: patient  Site marked: site marked  Timeout: Immediately prior to procedure a time out was called to verify the correct patient, procedure, equipment, support staff and site/side marked as required   Supporting Documentation  Indications: pain   Procedure Details  Location: shoulder - L subacromial bursa  Preparation: Patient was prepped and draped in the usual sterile fashion  Needle size: 22 G  Approach: posterior  Medications administered: 40 mg methylPREDNISolone acetate 40 MG/ML; 2 mL lidocaine 2%  Patient tolerance: patient tolerated the procedure " well with no immediate complications        Assessment and Plan      Diagnosis Plan   1. Traumatic incomplete tear of left rotator cuff, subsequent encounter        2. Acute pain of left shoulder due to trauma            Discussion of orthopaedic goals and activities and patient expressed appreciation.  Regular exercise as tolerated  Ice, heat, and/or modalities as beneficial  Watch for signs and symptoms of infection  Call or notify for any adverse effect from injection therapy    Recommendations:  Usual activities, routine exercise as tolerated, light physical work as tolerated, no strenuous activity.    Return in about 6 weeks (around 4/11/2024) for Recheck.  Patient agreeable to call or return sooner for any concerns.

## 2024-03-06 ENCOUNTER — PATIENT MESSAGE (OUTPATIENT)
Dept: BARIATRICS/WEIGHT MGMT | Facility: CLINIC | Age: 54
End: 2024-03-06
Payer: COMMERCIAL

## 2024-03-06 DIAGNOSIS — E66.09 CLASS 1 OBESITY DUE TO EXCESS CALORIES WITHOUT SERIOUS COMORBIDITY WITH BODY MASS INDEX (BMI) OF 32.0 TO 32.9 IN ADULT: Primary | ICD-10-CM

## 2024-03-07 RX ORDER — TIRZEPATIDE 15 MG/.5ML
15 INJECTION, SOLUTION SUBCUTANEOUS WEEKLY
Qty: 2 ML | Refills: 0 | Status: SHIPPED | OUTPATIENT
Start: 2024-03-07

## 2024-03-07 NOTE — TELEPHONE ENCOUNTER
From: Erika Alfonso  To: Nannette Haas  Sent: 3/6/2024 8:02 AM EST  Subject: Zepbound prescription     I need the next higher dose of zepbound sent to my pharmacy. Thank you.

## 2024-04-09 ENCOUNTER — OFFICE VISIT (OUTPATIENT)
Dept: ORTHOPEDIC SURGERY | Facility: CLINIC | Age: 54
End: 2024-04-09
Payer: COMMERCIAL

## 2024-04-09 VITALS — HEIGHT: 66 IN | WEIGHT: 211.2 LBS | TEMPERATURE: 98.6 F | BODY MASS INDEX: 33.94 KG/M2

## 2024-04-09 DIAGNOSIS — S46.012D TRAUMATIC INCOMPLETE TEAR OF LEFT ROTATOR CUFF, SUBSEQUENT ENCOUNTER: Primary | ICD-10-CM

## 2024-04-09 PROCEDURE — 99213 OFFICE O/P EST LOW 20 MIN: CPT | Performed by: STUDENT IN AN ORGANIZED HEALTH CARE EDUCATION/TRAINING PROGRAM

## 2024-04-09 RX ORDER — CYCLOBENZAPRINE HCL 10 MG
10 TABLET ORAL NIGHTLY PRN
Qty: 30 TABLET | Refills: 0 | Status: SHIPPED | OUTPATIENT
Start: 2024-04-09

## 2024-04-09 NOTE — PROGRESS NOTES
Office Note     Name: Erika Alfonso    : 1970     MRN: [id number]     Chief Complaint  Follow-up of the Left Shoulder (States she feels like she is doing better, the injection helped. She feels like therapy is helping but she is unable to go consistently due to work. )    Subjective     History of Present Illness:  Erika Alfonso is a 53 y.o. female presenting today for 6-week follow-up for left shoulder.  Patient states that she is doing significantly better.  She denies any significant pain at rest other than when trying to sleep at night.  She notes that she is a side sleeper and when she sleeps on the affected shoulder it will occasionally hurt significantly and interrupts her sleep.  Otherwise she is continuing physical therapy at 1 time per week as well as her home exercise plan.  She states the symptoms are improving and overall is doing well.    Review of Systems   Constitutional:  Negative for fever.   HENT:  Negative for dental problem and voice change.    Eyes:  Negative for visual disturbance.   Respiratory:  Negative for shortness of breath.    Cardiovascular:  Negative for chest pain.   Gastrointestinal:  Negative for abdominal pain.   Genitourinary:  Negative for dysuria.   Musculoskeletal:  Positive for arthralgias (left shoulder). Negative for gait problem and joint swelling.   Skin:  Negative for rash.   Neurological:  Negative for speech difficulty.   Hematological:  Does not bruise/bleed easily.   Psychiatric/Behavioral:  Negative for confusion.         Past Medical History:   Diagnosis Date    Anxiety     Bell's palsy     Bipolar disorder     Constipation     Depression     Disease of thyroid gland     Esophageal reflux 2023    Hypercholesteremia 01/10/2019    PCOS (polycystic ovarian syndrome)         Past Surgical History:   Procedure Laterality Date    APPENDECTOMY N/A 10/03/2021    Procedure: APPENDECTOMY LAPAROSCOPIC;  Surgeon: Nick Perales MD;   Location: [facility] OR;  Service: General;  Laterality: N/A;    BREAST BIOPSY  right    BREAST SURGERY      ENDOMETRIAL ABLATION      LAPAROSCOPIC TUBAL LIGATION      ORIF FOOT FRACTURE Right 2020    ORIF done by [facility], she is unsure of the surgeons name.    OVARIAN CYST REMOVAL         Family History   Problem Relation Age of Onset    Depression Mother     Hypertension Mother     Obesity Mother     COPD Mother     Heart disease Mother     Kidney disease Mother     Mental illness Mother     Miscarriages / Stillbirths Mother     Cancer Father     Depression Father     Hypertension Father     Arthritis Father     Hearing loss Father     Cancer Sister     Depression Sister     Obesity Sister     Hypertension Maternal Grandfather     Cancer Paternal Grandfather     Depression Paternal Grandfather     Hypertension Paternal Grandfather     Obesity Paternal Grandfather     Arthritis Paternal Grandfather     Diabetes Paternal Grandfather     Cancer Maternal Aunt     Depression Maternal Aunt     Hypertension Maternal Aunt     Obesity Maternal Aunt     Asthma Sister     Cancer Sister     Depression Sister     Miscarriages / Stillbirths Sister        Social History     Socioeconomic History    Marital status:    Tobacco Use    Smoking status: Never    Smokeless tobacco: Never   Vaping Use    Vaping status: Never Used   Substance and Sexual Activity    Alcohol use: Yes     Alcohol/week: 3.0 standard drinks of alcohol     Types: 3 Glasses of wine per week     Comment: Occasionally    Drug use: Never    Sexual activity: Defer     Partners: Male     Birth control/protection: Post-menopausal, None, Hysterectomy         Current Outpatient Medications:     amphetamine-dextroamphetamine (ADDERALL) 20 MG tablet, Take 1 tablet by mouth 2 (Two) Times a Day., Disp: , Rfl:     ARIPiprazole (ABILIFY) 10 MG tablet, Take 1 tablet by mouth Daily., Disp: , Rfl:     buPROPion SR (WELLBUTRIN SR) 150 MG 12 hr tablet, Take 1 tablet by mouth 2  "(Two) Times a Day., Disp: , Rfl:     busPIRone (BUSPAR) 7.5 MG tablet, Take 1 tablet by mouth every night at bedtime., Disp: , Rfl:     cyclobenzaprine (FLEXERIL) 10 MG tablet, Take 1 tablet by mouth At Night As Needed for Muscle Spasms., Disp: 30 tablet, Rfl: 0    FLUoxetine (PROzac) 40 MG capsule, Take 1 capsule by mouth Every Morning., Disp: , Rfl:     fluticasone (FLONASE) 50 MCG/ACT nasal spray, 2 sprays into the nostril(s) as directed by provider Daily., Disp: , Rfl:     lamoTRIgine (LaMICtal) 200 MG tablet, Take 1 tablet by mouth Daily., Disp: , Rfl:     Levocetirizine Dihydrochloride (XYZAL ALLERGY 24HR PO), Take 1 tablet by mouth Daily., Disp: , Rfl:     levothyroxine (SYNTHROID, LEVOTHROID) 100 MCG tablet, Take 1 tablet by mouth Daily., Disp: , Rfl:     linaclotide (Linzess) 72 MCG capsule capsule, TAKE 1 CAPSULE BY MOUTH EVERY DAY, Disp: 90 capsule, Rfl: 3    multivitamin with minerals (Multivitamin Adults) tablet tablet, Take 1 tablet by mouth Daily., Disp: 30 tablet, Rfl: 2    Omega-3 Fatty Acids (fish oil) 1000 MG capsule capsule, Take 1 capsule by mouth 2 (Two) Times a Day With Meals., Disp: , Rfl:     Tirzepatide-Weight Management (Zepbound) 15 MG/0.5ML solution auto-injector, Inject 0.5 mL under the skin into the appropriate area as directed 1 (One) Time Per Week., Disp: 2 mL, Rfl: 0    No Known Allergies        Objective   Temp 98.6 °F (37 °C)   Ht 167.6 cm (65.98\")   Wt 95.8 kg (211 lb 3.2 oz)   BMI 34.11 kg/m²            Physical Exam  Right Shoulder Exam     Muscle Strength   Abduction: 5/5   Internal rotation: 5/5   External rotation: 5/5   Supraspinatus: 5/5   Subscapularis: 5/5   Biceps: 5/5       Left Shoulder Exam     Range of Motion   Active abduction:  140   Passive abduction:  150   Extension:  50   External rotation:  normal   Forward flexion:  130   Internal rotation 0 degrees:  normal   Internal rotation 90 degrees:  normal     Muscle Strength   Abduction: 4/5   Internal rotation: " 5/5   External rotation: 5/5   Supraspinatus: 5/5   Subscapularis: 5/5   Biceps: 4/5     Tests   Bui test: positive  Impingement: negative  Drop arm: negative            Extremity DVT signs are negative by clinical screen.     Independent Review of Radiographic Studies:    No new imaging done today.    Procedures    Assessment and Plan   Diagnoses and all orders for this visit:    1. Traumatic incomplete tear of left rotator cuff, subsequent encounter (Primary)  -     cyclobenzaprine (FLEXERIL) 10 MG tablet; Take 1 tablet by mouth At Night As Needed for Muscle Spasms.  Dispense: 30 tablet; Refill: 0       Discussion of orthopedic goals  Orthopedic activities reviewed and patient expressed appreciation  Regular exercise as tolerated  Risk, benefits, and merits of treatment alternatives reviewed with the patient and questions answered  Patient guided on mobility and conditioning exercises  Ice, heat, and/or modalities as beneficial  Physical therapy ongoing  Take prescribed medications as instructed only as tolerated  Counseling on diet, nutrition, fitness exercise, and weight reduction goals    Recommendations/Plan:  Exercise, medications, injections, other patient advice, and return appointment as noted.  Patient is encouraged to call or return for any issues or concerns.    Encouraged to continue with physical therapy and home exercise plan.  She was prescribed Flexeril for difficulty sleeping at night due to pain.  Discussed when to take and potential side effects.  Advised may repeat injection in 6 weeks if desired.  Otherwise follow-up as needed.    Return in about 6 weeks (around 5/21/2024), or if symptoms worsen or fail to improve.  Patient agreeable to call or return sooner for any concerns.

## 2024-04-30 DIAGNOSIS — E66.09 CLASS 1 OBESITY DUE TO EXCESS CALORIES WITHOUT SERIOUS COMORBIDITY WITH BODY MASS INDEX (BMI) OF 32.0 TO 32.9 IN ADULT: ICD-10-CM

## 2024-04-30 RX ORDER — METFORMIN HYDROCHLORIDE 500 MG/1
TABLET, EXTENDED RELEASE ORAL
Qty: 270 TABLET | Refills: 0 | Status: SHIPPED | OUTPATIENT
Start: 2024-04-30

## 2024-06-27 ENCOUNTER — OFFICE VISIT (OUTPATIENT)
Dept: ORTHOPEDIC SURGERY | Facility: CLINIC | Age: 54
End: 2024-06-27
Payer: COMMERCIAL

## 2024-06-27 VITALS — WEIGHT: 213 LBS | HEIGHT: 66 IN | BODY MASS INDEX: 34.23 KG/M2 | TEMPERATURE: 98.3 F

## 2024-06-27 DIAGNOSIS — S46.012D TRAUMATIC INCOMPLETE TEAR OF LEFT ROTATOR CUFF, SUBSEQUENT ENCOUNTER: Primary | ICD-10-CM

## 2024-06-27 PROCEDURE — 20610 DRAIN/INJ JOINT/BURSA W/O US: CPT | Performed by: STUDENT IN AN ORGANIZED HEALTH CARE EDUCATION/TRAINING PROGRAM

## 2024-06-27 RX ORDER — METHYLPREDNISOLONE ACETATE 40 MG/ML
40 INJECTION, SUSPENSION INTRA-ARTICULAR; INTRALESIONAL; INTRAMUSCULAR; SOFT TISSUE
Status: COMPLETED | OUTPATIENT
Start: 2024-06-27 | End: 2024-06-27

## 2024-06-27 RX ORDER — VIBEGRON 75 MG/1
1 TABLET, FILM COATED ORAL DAILY
COMMUNITY
Start: 2024-04-16

## 2024-06-27 RX ORDER — BUPROPION HYDROCHLORIDE 300 MG/1
1 TABLET ORAL DAILY
COMMUNITY
Start: 2024-05-30

## 2024-06-27 RX ORDER — LIRAGLUTIDE 6 MG/ML
INJECTION, SOLUTION SUBCUTANEOUS
COMMUNITY
Start: 2024-06-03

## 2024-06-27 RX ORDER — LIDOCAINE HYDROCHLORIDE 20 MG/ML
2 INJECTION, SOLUTION INFILTRATION; PERINEURAL
Status: COMPLETED | OUTPATIENT
Start: 2024-06-27 | End: 2024-06-27

## 2024-06-27 RX ADMIN — LIDOCAINE HYDROCHLORIDE 2 ML: 20 INJECTION, SOLUTION INFILTRATION; PERINEURAL at 09:47

## 2024-06-27 RX ADMIN — METHYLPREDNISOLONE ACETATE 40 MG: 40 INJECTION, SUSPENSION INTRA-ARTICULAR; INTRALESIONAL; INTRAMUSCULAR; SOFT TISSUE at 09:47

## 2024-06-27 NOTE — PROGRESS NOTES
"    Office Note     Name: Erika Alfonso    : 1970     MRN: [id number]     Chief Complaint  Follow-up and Injections of the Left Shoulder (Last injection 24. )    Subjective     History of Present Illness:  Erika Alfonso is a 54 y.o. female here today for injection therapy.    Review of Systems     Past Medical History:   Diagnosis Date    Anxiety     Bell's palsy     Bipolar disorder     Constipation     Depression     Disease of thyroid gland     Esophageal reflux 2023    Hypercholesteremia 01/10/2019    PCOS (polycystic ovarian syndrome)          Past Surgical History:   Procedure Laterality Date    APPENDECTOMY N/A 10/03/2021    Procedure: APPENDECTOMY LAPAROSCOPIC;  Surgeon: Nick Perales MD;  Location: [facility];  Service: General;  Laterality: N/A;    BREAST BIOPSY  right    BREAST SURGERY      ENDOMETRIAL ABLATION      LAPAROSCOPIC TUBAL LIGATION      ORIF FOOT FRACTURE Right 2020    ORIF done by [facility], she is unsure of the surgeons name.    OVARIAN CYST REMOVAL         No Known Allergies      Objective   Temp 98.3 °F (36.8 °C)   Ht 167.6 cm (65.98\")   Wt 96.6 kg (213 lb)   BMI 34.40 kg/m²    Physical Exam    Skin exam stable with no erythema, ecchymosis or rash.  No new swelling.  No motor or sensory changes.  Distal pulse intact.    Large Joint Arthrocentesis: L subacromial bursa  Date/Time: 2024 9:47 AM  Consent given by: patient  Site marked: site marked  Timeout: Immediately prior to procedure a time out was called to verify the correct patient, procedure, equipment, support staff and site/side marked as required   Supporting Documentation  Indications: pain   Procedure Details  Location: shoulder - L subacromial bursa  Preparation: Patient was prepped and draped in the usual sterile fashion  Needle size: 22 G  Approach: posterior  Medications administered: 40 mg methylPREDNISolone acetate 40 MG/ML; 2 mL lidocaine 2%  Patient tolerance: patient tolerated the " procedure well with no immediate complications        Assessment and Plan      Diagnosis Plan   1. Traumatic incomplete tear of left rotator cuff, subsequent encounter            Discussion of orthopaedic goals and activities and patient expressed appreciation.  Regular exercise as tolerated  Ice, heat, and/or modalities as beneficial  Watch for signs and symptoms of infection  Call or notify for any adverse effect from injection therapy    Recommendations:  Usual activities, routine exercise as tolerated, light physical work as tolerated, no strenuous activity.    Patient states that she had a flareup of her left shoulder pain after moving a lot of boxes.  She was given a cortisone injection today for symptomatic relief.  Advise follow-up with me as needed.    Return if symptoms worsen or fail to improve.  Patient agreeable to call or return sooner for any concerns.

## 2024-07-27 DIAGNOSIS — E66.09 CLASS 1 OBESITY DUE TO EXCESS CALORIES WITHOUT SERIOUS COMORBIDITY WITH BODY MASS INDEX (BMI) OF 32.0 TO 32.9 IN ADULT: ICD-10-CM

## 2024-07-30 RX ORDER — METFORMIN HYDROCHLORIDE 500 MG/1
TABLET, EXTENDED RELEASE ORAL
Qty: 270 TABLET | Refills: 0 | Status: SHIPPED | OUTPATIENT
Start: 2024-07-30

## (undated) DEVICE — ENDOPATH XCEL UNIVERSAL TROCAR STABLILITY SLEEVES: Brand: ENDOPATH XCEL

## (undated) DEVICE — PK LAP GEN 70

## (undated) DEVICE — GLV SURG PREMIERPRO MIC LTX PF SZ8 BRN

## (undated) DEVICE — SUT VIC 0/0 UR6 27IN DYED J603H

## (undated) DEVICE — HOLDER: Brand: DEROYAL

## (undated) DEVICE — ENDOPATH XCEL BLADELESS TROCARS WITH STABILITY SLEEVES: Brand: ENDOPATH XCEL

## (undated) DEVICE — TOTAL TRAY, 16FR 10ML SIL FOLEY, URN: Brand: MEDLINE

## (undated) DEVICE — SYS CLOSE PORTII CARTR/THOMASN XL

## (undated) DEVICE — [HIGH FLOW INSUFFLATOR,  DO NOT USE IF PACKAGE IS DAMAGED,  KEEP DRY,  KEEP AWAY FROM SUNLIGHT,  PROTECT FROM HEAT AND RADIOACTIVE SOURCES.]: Brand: PNEUMOSURE

## (undated) DEVICE — GLV SURG SENSICARE W/ALOE PF LF 7.5 STRL

## (undated) DEVICE — APPL CHLORAPREP HI/LITE 26ML ORNG

## (undated) DEVICE — ECHELON FLEX45 ENDOPATH STAPLER, ARTICULATING ENDOSCOPIC LINEAR CUTTER (NO CARTRIDGE): Brand: ECHELON ENDOPATH

## (undated) DEVICE — PATIENT RETURN ELECTRODE, SINGLE-USE, CONTACT QUALITY MONITORING, ADULT, WITH 9FT CORD, FOR PATIENTS WEIGING OVER 33LBS. (15KG): Brand: MEGADYNE

## (undated) DEVICE — SUT MNCRYL PLS ANTIB UD 4/0 PS2 18IN

## (undated) DEVICE — DRP UTIL 2/LAYR W/TP 15X26IN STRL PK/4

## (undated) DEVICE — THE ECHELON FLEX POWERED PLUS ARTICULATING ENDOSCOPIC LINEAR CUTTERS ARE STERILE, SINGLE PATIENT USE INSTRUMENTS THAT SIMULTANEOUSLYCUT AND STAPLE TISSUE. THERE ARE SIX STAGGERED ROWS OF STAPLES, THREE ON EITHER SIDE OF THE CUT LINE. THE ECHELON FLEX 45 POWERED PLUSINSTRUMENTS HAVE A STAPLE LINE THAT IS APPROXIMATELY 45 MM LONG AND A CUT LINE THAT IS APPROXIMATELY 42 MM LONG. THE SHAFT CAN ROTATE FREELYIN BOTH DIRECTIONS AND AN ARTICULATION MECHANISM ENABLES THE DISTAL PORTION OF THE SHAFT TO PIVOT TO FACILITATE LATERAL ACCESS TO THE OPERATIVESITE.THE INSTRUMENTS ARE PACKAGED WITH A PRIMARY LITHIUM BATTERY PACK THAT MUST BE INSTALLED PRIOR TO USE. THERE ARE SPECIFIC REQUIREMENTS FORDISPOSING OF THE BATTERY PACK. REFER TO THE BATTERY PACK DISPOSAL SECTION.THE INSTRUMENTS ARE PACKAGED WITHOUT A RELOAD AND MUST BE LOADED PRIOR TO USE. A STAPLE RETAINING CAP ON THE RELOAD PROTECTS THE STAPLE LEGPOINTS DURING SHIPPING AND TRANSPORTATION. THE INSTRUMENTS’ LOCK-OUT FEATURE IS DESIGNED TO PREVENT A USED OR IMPROPERLY INSTALLED RELOADFROM BEING REFIRED OR AN INSTRUMENT FROM BEING FIRED WITHOUT A RELOAD.: Brand: ECHELON FLEX

## (undated) DEVICE — APPL HEMO SURG ARISTA/AH/FLEXITIP XL 38CM

## (undated) DEVICE — ADHS SKIN PREMIERPRO EXOFIN TOPICAL HI/VISC .5ML

## (undated) DEVICE — UNDERGLV SURG BIOGEL INDICAT PF 8 GRN